# Patient Record
Sex: FEMALE | Race: WHITE | URBAN - METROPOLITAN AREA
[De-identification: names, ages, dates, MRNs, and addresses within clinical notes are randomized per-mention and may not be internally consistent; named-entity substitution may affect disease eponyms.]

---

## 2022-02-22 ENCOUNTER — HISTORICAL (OUTPATIENT)
Dept: ADMINISTRATIVE | Facility: HOSPITAL | Age: 87
End: 2022-02-22

## 2022-02-22 LAB
ABS NEUT (OLG): 4.68 (ref 2.1–9.2)
ALBUMIN SERPL-MCNC: 2.7 G/DL (ref 3.4–4.8)
ALBUMIN/GLOB SERPL: 1 {RATIO} (ref 1.1–2)
ALP SERPL-CCNC: 412 U/L (ref 40–150)
ALT SERPL-CCNC: 24 U/L (ref 0–55)
AST SERPL-CCNC: 25 U/L (ref 5–34)
BASOPHILS # BLD AUTO: 0 10*3/UL (ref 0–0.2)
BASOPHILS NFR BLD AUTO: 0 %
BILIRUB SERPL-MCNC: 0.6 MG/DL
BILIRUBIN DIRECT+TOT PNL SERPL-MCNC: 0.2 (ref 0–0.5)
BILIRUBIN DIRECT+TOT PNL SERPL-MCNC: 0.4 (ref 0–0.8)
BUN SERPL-MCNC: 18.4 MG/DL (ref 9.8–20.1)
CALCIUM SERPL-MCNC: 8.9 MG/DL (ref 8.7–10.5)
CHLORIDE SERPL-SCNC: 103 MMOL/L (ref 98–107)
CHOLEST SERPL-MCNC: 212 MG/DL
CHOLEST/HDLC SERPL: 11 {RATIO} (ref 0–5)
CO2 SERPL-SCNC: 28 MMOL/L (ref 23–31)
CREAT SERPL-MCNC: 1.1 MG/DL (ref 0.55–1.02)
DEPRECATED CALCIDIOL+CALCIFEROL SERPL-MC: 94.4 NG/ML (ref 30–80)
EOSINOPHIL # BLD AUTO: 0.3 10*3/UL (ref 0–0.9)
EOSINOPHIL NFR BLD AUTO: 4 %
ERYTHROCYTE [DISTWIDTH] IN BLOOD BY AUTOMATED COUNT: 14.3 % (ref 11.5–17)
GLOBULIN SER-MCNC: 2.6 G/DL (ref 2.4–3.5)
GLUCOSE SERPL-MCNC: 143 MG/DL (ref 82–115)
HCT VFR BLD AUTO: 33.2 % (ref 37–47)
HDLC SERPL-MCNC: 19 MG/DL (ref 35–60)
HEMOLYSIS INTERF INDEX SERPL-ACNC: 25
HEMOLYSIS INTERF INDEX SERPL-ACNC: 25
HGB BLD-MCNC: 10.2 G/DL (ref 12–16)
ICTERIC INTERF INDEX SERPL-ACNC: 0
LDLC SERPL CALC-MCNC: 146 MG/DL (ref 50–140)
LIPEMIC INTERF INDEX SERPL-ACNC: 9
LYMPHOCYTES # BLD AUTO: 1.8 10*3/UL (ref 0.6–4.6)
LYMPHOCYTES NFR BLD AUTO: 24 %
MAGNESIUM SERPL-MCNC: 2.1 MG/DL (ref 1.6–2.6)
MANUAL DIFF? (OHS): NO
MCH RBC QN AUTO: 29.9 PG (ref 27–31)
MCHC RBC AUTO-ENTMCNC: 30.7 G/DL (ref 33–36)
MCV RBC AUTO: 97.4 FL (ref 80–94)
MONOCYTES # BLD AUTO: 0.7 10*3/UL (ref 0.1–1.3)
MONOCYTES NFR BLD AUTO: 9 %
NEUTROPHILS # BLD AUTO: 4.68 10*3/UL (ref 2.1–9.2)
NEUTROPHILS NFR BLD AUTO: 62 %
PLATELET # BLD AUTO: 363 10*3/UL (ref 130–400)
PMV BLD AUTO: 10.6 FL (ref 9.4–12.4)
POTASSIUM SERPL-SCNC: 4.2 MMOL/L (ref 3.5–5.1)
PROT SERPL-MCNC: 5.3 G/DL (ref 5.8–7.6)
RBC # BLD AUTO: 3.41 10*6/UL (ref 4.2–5.4)
SODIUM SERPL-SCNC: 140 MMOL/L (ref 136–145)
T4 FREE SERPL-MCNC: 0.96 NG/DL (ref 0.7–1.48)
TRIGL SERPL-MCNC: 234 MG/DL (ref 37–140)
TSH SERPL-ACNC: 0.76 M[IU]/L (ref 0.35–4.94)
VLDLC SERPL CALC-MCNC: 47 MG/DL
WBC # SPEC AUTO: 7.5 10*3/UL (ref 4.5–11.5)

## 2022-02-23 ENCOUNTER — HISTORICAL (OUTPATIENT)
Dept: ADMINISTRATIVE | Facility: HOSPITAL | Age: 87
End: 2022-02-23

## 2022-02-23 LAB
ALBUMIN SERPL-MCNC: 2.8 G/DL (ref 3.4–4.8)
ALBUMIN/GLOB SERPL: 1.2 {RATIO} (ref 1.1–2)
ALP SERPL-CCNC: 395 U/L (ref 40–150)
ALT SERPL-CCNC: 24 U/L (ref 0–55)
AST SERPL-CCNC: 40 U/L (ref 5–34)
BILIRUB SERPL-MCNC: 0.7 MG/DL
BILIRUBIN DIRECT+TOT PNL SERPL-MCNC: 0.3 (ref 0–0.5)
BILIRUBIN DIRECT+TOT PNL SERPL-MCNC: 0.4 (ref 0–0.8)
BUN SERPL-MCNC: 18.2 MG/DL (ref 9.8–20.1)
CALCIUM SERPL-MCNC: 8.6 MG/DL (ref 8.7–10.5)
CHLORIDE SERPL-SCNC: 106 MMOL/L (ref 98–107)
CO2 SERPL-SCNC: 29 MMOL/L (ref 23–31)
CREAT SERPL-MCNC: 0.75 MG/DL (ref 0.55–1.02)
GLOBULIN SER-MCNC: 2.4 G/DL (ref 2.4–3.5)
GLUCOSE SERPL-MCNC: 98 MG/DL (ref 82–115)
HEMOLYSIS INTERF INDEX SERPL-ACNC: 1
ICTERIC INTERF INDEX SERPL-ACNC: 1
LIPEMIC INTERF INDEX SERPL-ACNC: 0
POTASSIUM SERPL-SCNC: 3.8 MMOL/L (ref 3.5–5.1)
PROT SERPL-MCNC: 5.2 G/DL (ref 5.8–7.6)
SODIUM SERPL-SCNC: 144 MMOL/L (ref 136–145)

## 2022-03-26 ENCOUNTER — HISTORICAL (OUTPATIENT)
Dept: ADMINISTRATIVE | Facility: HOSPITAL | Age: 87
End: 2022-03-26

## 2022-03-26 LAB
ABS NEUT (OLG): 11.36 (ref 2.1–9.2)
APPEARANCE, UA: CLEAR
BACTERIA SPEC CULT: NORMAL
BASOPHILS # BLD AUTO: 0 10*3/UL (ref 0–0.2)
BASOPHILS NFR BLD AUTO: 0 %
BILIRUB UR QL STRIP: NORMAL
BUDDING YEAST: NORMAL
BUN SERPL-MCNC: 26.6 MG/DL (ref 9.8–20.1)
CALCIUM SERPL-MCNC: 9.1 MG/DL (ref 8.7–10.5)
CASTS, UA: NORMAL
CHLORIDE SERPL-SCNC: 102 MMOL/L (ref 98–107)
CO2 SERPL-SCNC: 23 MMOL/L (ref 23–31)
COLOR UR: NORMAL
CREAT SERPL-MCNC: 1.04 MG/DL (ref 0.55–1.02)
CREAT/UREA NIT SERPL: 26
CRYSTALS: NORMAL
ERYTHROCYTE [DISTWIDTH] IN BLOOD BY AUTOMATED COUNT: 14.7 % (ref 11.5–17)
GLUCOSE (UA): NEGATIVE
GLUCOSE SERPL-MCNC: 181 MG/DL (ref 82–115)
HCT VFR BLD AUTO: 38 % (ref 37–47)
HEMOLYSIS INTERF INDEX SERPL-ACNC: 18
HGB BLD-MCNC: 11.7 G/DL (ref 12–16)
HGB UR QL STRIP: NEGATIVE
ICTERIC INTERF INDEX SERPL-ACNC: 3
KETONES UR QL STRIP: NEGATIVE
LEUKOCYTE ESTERASE UR QL STRIP: NORMAL
LIPEMIC INTERF INDEX SERPL-ACNC: 0
LYMPHOCYTES # BLD AUTO: 1.2 10*3/UL (ref 0.6–4.6)
LYMPHOCYTES NFR BLD AUTO: 9 %
MANUAL DIFF? (OHS): NO
MCH RBC QN AUTO: 29.7 PG (ref 27–31)
MCHC RBC AUTO-ENTMCNC: 30.8 G/DL (ref 33–36)
MCV RBC AUTO: 96.4 FL (ref 80–94)
MONOCYTES # BLD AUTO: 0.8 10*3/UL (ref 0.1–1.3)
MONOCYTES NFR BLD AUTO: 6 %
NEUTROPHILS # BLD AUTO: 11.36 10*3/UL (ref 2.1–9.2)
NEUTROPHILS NFR BLD AUTO: 83 %
NITRITE UR QL STRIP: NEGATIVE
PH UR STRIP: 6.5 [PH] (ref 5–9)
PLATELET # BLD AUTO: 268 10*3/UL (ref 130–400)
PMV BLD AUTO: 11.2 FL (ref 9.4–12.4)
POTASSIUM SERPL-SCNC: 4.1 MMOL/L (ref 3.5–5.1)
PROT UR QL STRIP: NORMAL
RBC # BLD AUTO: 3.94 10*6/UL (ref 4.2–5.4)
RBC #/AREA URNS HPF: NORMAL /[HPF] (ref 0–2)
SMALL ROUND CELLS, UA: NORMAL
SODIUM SERPL-SCNC: 138 MMOL/L (ref 136–145)
SP GR UR STRIP: 1.02 (ref 1–1.03)
SPERM URNS QL MICRO: NORMAL
SQUAMOUS EPITHELIAL, UA: NORMAL (ref 0–4)
UROBILINOGEN UR STRIP-ACNC: 2
WBC # SPEC AUTO: 13.6 10*3/UL (ref 4.5–11.5)
WBC #/AREA URNS HPF: 15 /[HPF] (ref 0–3)

## 2022-03-28 ENCOUNTER — HISTORICAL (OUTPATIENT)
Dept: ADMINISTRATIVE | Facility: HOSPITAL | Age: 87
End: 2022-03-28

## 2022-03-28 LAB
ABS NEUT (OLG): 9.05 (ref 2.1–9.2)
BASOPHILS # BLD AUTO: 0 10*3/UL (ref 0–0.2)
BASOPHILS NFR BLD AUTO: 0 %
BUN SERPL-MCNC: 18 MG/DL (ref 9.8–20.1)
CALCIUM SERPL-MCNC: 9.3 MG/DL (ref 8.7–10.5)
CHLORIDE SERPL-SCNC: 101 MMOL/L (ref 98–107)
CO2 SERPL-SCNC: 24 MMOL/L (ref 23–31)
CREAT SERPL-MCNC: 0.74 MG/DL (ref 0.55–1.02)
CREAT/UREA NIT SERPL: 24
EOSINOPHIL # BLD AUTO: 0.2 10*3/UL (ref 0–0.9)
EOSINOPHIL NFR BLD AUTO: 2 %
ERYTHROCYTE [DISTWIDTH] IN BLOOD BY AUTOMATED COUNT: 14.6 % (ref 11.5–17)
GLUCOSE SERPL-MCNC: 136 MG/DL (ref 82–115)
HCT VFR BLD AUTO: 38.7 % (ref 37–47)
HEMOLYSIS INTERF INDEX SERPL-ACNC: 10
HGB BLD-MCNC: 11.9 G/DL (ref 12–16)
ICTERIC INTERF INDEX SERPL-ACNC: 1
LIPEMIC INTERF INDEX SERPL-ACNC: 6
LYMPHOCYTES # BLD AUTO: 1.9 10*3/UL (ref 0.6–4.6)
LYMPHOCYTES NFR BLD AUTO: 16 %
MANUAL DIFF? (OHS): NO
MCH RBC QN AUTO: 30.1 PG (ref 27–31)
MCHC RBC AUTO-ENTMCNC: 30.7 G/DL (ref 33–36)
MCV RBC AUTO: 97.7 FL (ref 80–94)
MONOCYTES # BLD AUTO: 0.6 10*3/UL (ref 0.1–1.3)
MONOCYTES NFR BLD AUTO: 5 %
NEUTROPHILS # BLD AUTO: 9.05 10*3/UL (ref 2.1–9.2)
NEUTROPHILS NFR BLD AUTO: 76 %
PLATELET # BLD AUTO: 316 10*3/UL (ref 130–400)
PMV BLD AUTO: 11.3 FL (ref 9.4–12.4)
POTASSIUM SERPL-SCNC: 4.2 MMOL/L (ref 3.5–5.1)
RBC # BLD AUTO: 3.96 10*6/UL (ref 4.2–5.4)
SODIUM SERPL-SCNC: 138 MMOL/L (ref 136–145)
WBC # SPEC AUTO: 11.9 10*3/UL (ref 4.5–11.5)

## 2022-03-29 ENCOUNTER — HISTORICAL (OUTPATIENT)
Dept: ADMINISTRATIVE | Facility: HOSPITAL | Age: 87
End: 2022-03-29

## 2022-03-29 LAB
ABS NEUT (OLG): 7.21 (ref 2.1–9.2)
BASOPHILS # BLD AUTO: 0.1 10*3/UL (ref 0–0.2)
BASOPHILS NFR BLD AUTO: 1 %
BUN SERPL-MCNC: 19.4 MG/DL (ref 9.8–20.1)
CALCIUM SERPL-MCNC: 9.5 MG/DL (ref 8.7–10.5)
CHLORIDE SERPL-SCNC: 102 MMOL/L (ref 98–107)
CO2 SERPL-SCNC: 22 MMOL/L (ref 23–31)
CREAT SERPL-MCNC: 0.82 MG/DL (ref 0.55–1.02)
CREAT/UREA NIT SERPL: 24
EOSINOPHIL # BLD AUTO: 0.2 10*3/UL (ref 0–0.9)
EOSINOPHIL NFR BLD AUTO: 2 %
ERYTHROCYTE [DISTWIDTH] IN BLOOD BY AUTOMATED COUNT: 14.6 % (ref 11.5–17)
GLUCOSE SERPL-MCNC: 95 MG/DL (ref 82–115)
HCT VFR BLD AUTO: 44 % (ref 37–47)
HEMOLYSIS INTERF INDEX SERPL-ACNC: 39
HGB BLD-MCNC: 12.7 G/DL (ref 12–16)
ICTERIC INTERF INDEX SERPL-ACNC: 1
LIPEMIC INTERF INDEX SERPL-ACNC: 6
LYMPHOCYTES # BLD AUTO: 2.3 10*3/UL (ref 0.6–4.6)
LYMPHOCYTES NFR BLD AUTO: 22 %
MANUAL DIFF? (OHS): NO
MCH RBC QN AUTO: 29.7 PG (ref 27–31)
MCHC RBC AUTO-ENTMCNC: 28.9 G/DL (ref 33–36)
MCV RBC AUTO: 103 FL (ref 80–94)
MONOCYTES # BLD AUTO: 0.6 10*3/UL (ref 0.1–1.3)
MONOCYTES NFR BLD AUTO: 6 %
NEUTROPHILS # BLD AUTO: 7.21 10*3/UL (ref 2.1–9.2)
NEUTROPHILS NFR BLD AUTO: 69 %
PLATELET # BLD AUTO: 276 10*3/UL (ref 130–400)
PMV BLD AUTO: 11.2 FL (ref 9.4–12.4)
POS ERR1 (OHS): NORMAL
POTASSIUM SERPL-SCNC: 5 MMOL/L (ref 3.5–5.1)
RBC # BLD AUTO: 4.27 10*6/UL (ref 4.2–5.4)
SODIUM SERPL-SCNC: 138 MMOL/L (ref 136–145)
WBC # SPEC AUTO: 10.4 10*3/UL (ref 4.5–11.5)

## 2022-04-01 ENCOUNTER — HISTORICAL (OUTPATIENT)
Dept: ADMINISTRATIVE | Facility: HOSPITAL | Age: 87
End: 2022-04-01

## 2022-04-01 LAB
ABS NEUT (OLG): 6.42 (ref 2.1–9.2)
BASOPHILS # BLD AUTO: 0.1 10*3/UL (ref 0–0.2)
BASOPHILS NFR BLD AUTO: 1 %
BUN SERPL-MCNC: 18.7 MG/DL (ref 9.8–20.1)
CALCIUM SERPL-MCNC: 9.8 MG/DL (ref 8.7–10.5)
CHLORIDE SERPL-SCNC: 102 MMOL/L (ref 98–107)
CO2 SERPL-SCNC: 26 MMOL/L (ref 23–31)
CREAT SERPL-MCNC: 0.81 MG/DL (ref 0.55–1.02)
CREAT/UREA NIT SERPL: 23
EOSINOPHIL # BLD AUTO: 0.2 10*3/UL (ref 0–0.9)
EOSINOPHIL NFR BLD AUTO: 2 %
ERYTHROCYTE [DISTWIDTH] IN BLOOD BY AUTOMATED COUNT: 14.1 % (ref 11.5–17)
GLUCOSE SERPL-MCNC: 131 MG/DL (ref 82–115)
HCT VFR BLD AUTO: 41.4 % (ref 37–47)
HEMOLYSIS INTERF INDEX SERPL-ACNC: 22
HGB BLD-MCNC: 12.7 G/DL (ref 12–16)
ICTERIC INTERF INDEX SERPL-ACNC: 0
LIPEMIC INTERF INDEX SERPL-ACNC: 16
LYMPHOCYTES # BLD AUTO: 2.2 10*3/UL (ref 0.6–4.6)
LYMPHOCYTES NFR BLD AUTO: 24 %
MANUAL DIFF? (OHS): NO
MCH RBC QN AUTO: 30.3 PG (ref 27–31)
MCHC RBC AUTO-ENTMCNC: 30.7 G/DL (ref 33–36)
MCV RBC AUTO: 98.8 FL (ref 80–94)
MONOCYTES # BLD AUTO: 0.4 10*3/UL (ref 0.1–1.3)
MONOCYTES NFR BLD AUTO: 5 %
NEUTROPHILS # BLD AUTO: 6.42 10*3/UL (ref 2.1–9.2)
NEUTROPHILS NFR BLD AUTO: 68 %
PLATELET # BLD AUTO: 401 10*3/UL (ref 130–400)
PMV BLD AUTO: 11 FL (ref 9.4–12.4)
POTASSIUM SERPL-SCNC: 4.3 MMOL/L (ref 3.5–5.1)
RBC # BLD AUTO: 4.19 10*6/UL (ref 4.2–5.4)
SODIUM SERPL-SCNC: 140 MMOL/L (ref 136–145)
WBC # SPEC AUTO: 9.4 10*3/UL (ref 4.5–11.5)

## 2022-04-18 ENCOUNTER — HISTORICAL (OUTPATIENT)
Dept: ADMINISTRATIVE | Facility: HOSPITAL | Age: 87
End: 2022-04-18
Payer: MEDICARE

## 2022-04-18 LAB
ABS NEUT (OLG): 3.35 (ref 2.1–9.2)
ALBUMIN SERPL-MCNC: 3.9 G/DL (ref 3.4–4.8)
ALBUMIN/GLOB SERPL: 1.3 {RATIO} (ref 1.1–2)
ALP SERPL-CCNC: 102 U/L (ref 40–150)
ALT SERPL-CCNC: 10 U/L (ref 0–55)
AST SERPL-CCNC: 23 U/L (ref 5–34)
BASOPHILS # BLD AUTO: 0.1 10*3/UL (ref 0–0.2)
BASOPHILS NFR BLD AUTO: 1 %
BILIRUB SERPL-MCNC: 0.5 MG/DL
BILIRUBIN DIRECT+TOT PNL SERPL-MCNC: 0.2 (ref 0–0.5)
BILIRUBIN DIRECT+TOT PNL SERPL-MCNC: 0.3 (ref 0–0.8)
BUN SERPL-MCNC: 16.9 MG/DL (ref 9.8–20.1)
CALCIUM SERPL-MCNC: 9.5 MG/DL (ref 8.7–10.5)
CHLORIDE SERPL-SCNC: 104 MMOL/L (ref 98–107)
CHOLEST SERPL-MCNC: 280 MG/DL
CHOLEST/HDLC SERPL: 9 {RATIO} (ref 0–5)
CO2 SERPL-SCNC: 25 MMOL/L (ref 23–31)
CREAT SERPL-MCNC: 0.81 MG/DL (ref 0.55–1.02)
DEPRECATED CALCIDIOL+CALCIFEROL SERPL-MC: 66.7 NG/ML (ref 30–80)
EOSINOPHIL # BLD AUTO: 0.2 10*3/UL (ref 0–0.9)
EOSINOPHIL NFR BLD AUTO: 4 %
ERYTHROCYTE [DISTWIDTH] IN BLOOD BY AUTOMATED COUNT: 14.3 % (ref 11.5–17)
GLOBULIN SER-MCNC: 3.1 G/DL (ref 2.4–3.5)
GLUCOSE SERPL-MCNC: 102 MG/DL (ref 82–115)
HCT VFR BLD AUTO: 44.3 % (ref 37–47)
HDLC SERPL-MCNC: 30 MG/DL (ref 35–60)
HEMOLYSIS INTERF INDEX SERPL-ACNC: 150
HGB BLD-MCNC: 13.6 G/DL (ref 12–16)
ICTERIC INTERF INDEX SERPL-ACNC: 0
LDLC SERPL CALC-MCNC: NORMAL MG/DL (ref 50–140)
LIPEMIC INTERF INDEX SERPL-ACNC: 25
LYMPHOCYTES # BLD AUTO: 2.4 10*3/UL (ref 0.6–4.6)
LYMPHOCYTES NFR BLD AUTO: 36 %
MANUAL DIFF? (OHS): NO
MCH RBC QN AUTO: 29.3 PG (ref 27–31)
MCHC RBC AUTO-ENTMCNC: 30.7 G/DL (ref 33–36)
MCV RBC AUTO: 95.5 FL (ref 80–94)
MONOCYTES # BLD AUTO: 0.5 10*3/UL (ref 0.1–1.3)
MONOCYTES NFR BLD AUTO: 8 %
NEUTROPHILS # BLD AUTO: 3.35 10*3/UL (ref 2.1–9.2)
NEUTROPHILS NFR BLD AUTO: 51 %
PLATELET # BLD AUTO: 233 10*3/UL (ref 130–400)
PMV BLD AUTO: 11.5 FL (ref 9.4–12.4)
POTASSIUM SERPL-SCNC: 4.7 MMOL/L (ref 3.5–5.1)
PREALB SERPL-MCNC: 22.4 (ref 14–37)
PROT SERPL-MCNC: 7 G/DL (ref 5.8–7.6)
RBC # BLD AUTO: 4.64 10*6/UL (ref 4.2–5.4)
SODIUM SERPL-SCNC: 140 MMOL/L (ref 136–145)
T4 SERPL-MCNC: 6.8 UG/DL (ref 4.87–11.72)
TRIGL SERPL-MCNC: 446 MG/DL (ref 37–140)
VLDLC SERPL CALC-MCNC: NORMAL MG/DL
WBC # SPEC AUTO: 6.6 10*3/UL (ref 4.5–11.5)

## 2022-04-22 ENCOUNTER — HISTORICAL (OUTPATIENT)
Dept: ADMINISTRATIVE | Facility: HOSPITAL | Age: 87
End: 2022-04-22
Payer: MEDICARE

## 2022-04-22 LAB
CHOLEST SERPL-MCNC: 278 MG/DL
CHOLEST/HDLC SERPL: 10 {RATIO} (ref 0–5)
HDLC SERPL-MCNC: 28 MG/DL (ref 35–60)
LDLC SERPL CALC-MCNC: NORMAL MG/DL (ref 50–140)
TRIGL SERPL-MCNC: 527 MG/DL (ref 37–140)
VLDLC SERPL CALC-MCNC: NORMAL MG/DL

## 2022-05-20 ENCOUNTER — LAB REQUISITION (OUTPATIENT)
Dept: LAB | Facility: HOSPITAL | Age: 87
End: 2022-05-20
Payer: MEDICARE

## 2022-05-20 DIAGNOSIS — R79.9 ABNORMAL FINDING OF BLOOD CHEMISTRY, UNSPECIFIED: ICD-10-CM

## 2022-05-20 LAB
ANION GAP SERPL CALC-SCNC: 12 MEQ/L
BASOPHILS # BLD AUTO: 0.06 X10(3)/MCL (ref 0–0.2)
BASOPHILS NFR BLD AUTO: 0.6 %
BUN SERPL-MCNC: 20.6 MG/DL (ref 9.8–20.1)
CALCIUM SERPL-MCNC: 10.1 MG/DL (ref 8.4–10.2)
CHLORIDE SERPL-SCNC: 103 MMOL/L (ref 98–107)
CO2 SERPL-SCNC: 24 MMOL/L (ref 23–31)
CREAT SERPL-MCNC: 0.76 MG/DL (ref 0.55–1.02)
CREAT/UREA NIT SERPL: 27
EOSINOPHIL # BLD AUTO: 0.19 X10(3)/MCL (ref 0–0.9)
EOSINOPHIL NFR BLD AUTO: 2 %
ERYTHROCYTE [DISTWIDTH] IN BLOOD BY AUTOMATED COUNT: 13.6 % (ref 11.5–17)
GLUCOSE SERPL-MCNC: 105 MG/DL (ref 82–115)
HCT VFR BLD AUTO: 45.3 % (ref 37–47)
HGB BLD-MCNC: 14.1 GM/DL (ref 12–16)
IMM GRANULOCYTES # BLD AUTO: 0.04 X10(3)/MCL (ref 0–0.02)
IMM GRANULOCYTES NFR BLD AUTO: 0.4 % (ref 0–0.43)
LYMPHOCYTES # BLD AUTO: 2.99 X10(3)/MCL (ref 0.6–4.6)
LYMPHOCYTES NFR BLD AUTO: 31.4 %
MCH RBC QN AUTO: 29.4 PG (ref 27–31)
MCHC RBC AUTO-ENTMCNC: 31.1 MG/DL (ref 33–36)
MCV RBC AUTO: 94.6 FL (ref 80–94)
MONOCYTES # BLD AUTO: 0.74 X10(3)/MCL (ref 0.1–1.3)
MONOCYTES NFR BLD AUTO: 7.8 %
NEUTROPHILS # BLD AUTO: 5.5 X10(3)/MCL (ref 2.1–9.2)
NEUTROPHILS NFR BLD AUTO: 57.8 %
NRBC BLD AUTO-RTO: 0 %
PLATELET # BLD AUTO: 217 X10(3)/MCL (ref 130–400)
PMV BLD AUTO: 11.3 FL (ref 9.4–12.4)
POTASSIUM SERPL-SCNC: 4.9 MMOL/L (ref 3.5–5.1)
RBC # BLD AUTO: 4.79 X10(6)/MCL (ref 4.2–5.4)
SODIUM SERPL-SCNC: 139 MMOL/L (ref 136–145)
WBC # SPEC AUTO: 9.5 X10(3)/MCL (ref 4.5–11.5)

## 2022-05-20 PROCEDURE — 80048 BASIC METABOLIC PNL TOTAL CA: CPT | Performed by: NURSE PRACTITIONER

## 2022-05-20 PROCEDURE — 85025 COMPLETE CBC W/AUTO DIFF WBC: CPT | Performed by: NURSE PRACTITIONER

## 2022-08-10 ENCOUNTER — LAB REQUISITION (OUTPATIENT)
Dept: LAB | Facility: HOSPITAL | Age: 87
End: 2022-08-10
Payer: MEDICARE

## 2022-08-10 DIAGNOSIS — R79.9 ABNORMAL FINDING OF BLOOD CHEMISTRY, UNSPECIFIED: ICD-10-CM

## 2022-08-10 LAB
ANION GAP SERPL CALC-SCNC: 9 MEQ/L
BASOPHILS # BLD AUTO: 0.05 X10(3)/MCL (ref 0–0.2)
BASOPHILS NFR BLD AUTO: 0.6 %
BUN SERPL-MCNC: 18.8 MG/DL (ref 9.8–20.1)
CALCIUM SERPL-MCNC: 9.7 MG/DL (ref 8.4–10.2)
CHLORIDE SERPL-SCNC: 103 MMOL/L (ref 98–107)
CO2 SERPL-SCNC: 27 MMOL/L (ref 23–31)
CREAT SERPL-MCNC: 0.83 MG/DL (ref 0.55–1.02)
CREAT/UREA NIT SERPL: 23
EOSINOPHIL # BLD AUTO: 0.16 X10(3)/MCL (ref 0–0.9)
EOSINOPHIL NFR BLD AUTO: 1.8 %
ERYTHROCYTE [DISTWIDTH] IN BLOOD BY AUTOMATED COUNT: 14.4 % (ref 11.5–17)
GFR SERPLBLD CREATININE-BSD FMLA CKD-EPI: >60 MLS/MIN/1.73/M2
GLUCOSE SERPL-MCNC: 129 MG/DL (ref 82–115)
HCT VFR BLD AUTO: 44.5 % (ref 37–47)
HGB BLD-MCNC: 14 GM/DL (ref 12–16)
IMM GRANULOCYTES # BLD AUTO: 0.03 X10(3)/MCL (ref 0–0.04)
IMM GRANULOCYTES NFR BLD AUTO: 0.3 %
LYMPHOCYTES # BLD AUTO: 2.59 X10(3)/MCL (ref 0.6–4.6)
LYMPHOCYTES NFR BLD AUTO: 29.7 %
MCH RBC QN AUTO: 29.6 PG (ref 27–31)
MCHC RBC AUTO-ENTMCNC: 31.5 MG/DL (ref 33–36)
MCV RBC AUTO: 94.1 FL (ref 80–94)
MONOCYTES # BLD AUTO: 0.51 X10(3)/MCL (ref 0.1–1.3)
MONOCYTES NFR BLD AUTO: 5.8 %
NEUTROPHILS # BLD AUTO: 5.4 X10(3)/MCL (ref 2.1–9.2)
NEUTROPHILS NFR BLD AUTO: 61.8 %
NRBC BLD AUTO-RTO: 0 %
PLATELET # BLD AUTO: 214 X10(3)/MCL (ref 130–400)
PMV BLD AUTO: 11.4 FL (ref 7.4–10.4)
POTASSIUM SERPL-SCNC: 4.4 MMOL/L (ref 3.5–5.1)
RBC # BLD AUTO: 4.73 X10(6)/MCL (ref 4.2–5.4)
SODIUM SERPL-SCNC: 139 MMOL/L (ref 136–145)
WBC # SPEC AUTO: 8.7 X10(3)/MCL (ref 4.5–11.5)

## 2022-08-10 PROCEDURE — 85025 COMPLETE CBC W/AUTO DIFF WBC: CPT | Performed by: NURSE PRACTITIONER

## 2022-08-10 PROCEDURE — 80048 BASIC METABOLIC PNL TOTAL CA: CPT | Performed by: NURSE PRACTITIONER

## 2022-08-12 ENCOUNTER — LAB REQUISITION (OUTPATIENT)
Dept: LAB | Facility: HOSPITAL | Age: 87
End: 2022-08-12
Payer: MEDICARE

## 2022-08-12 DIAGNOSIS — R41.82 ALTERED MENTAL STATUS, UNSPECIFIED: ICD-10-CM

## 2022-08-12 LAB
APPEARANCE UR: CLEAR
BACTERIA #/AREA URNS AUTO: ABNORMAL /HPF
BILIRUB UR QL STRIP.AUTO: NEGATIVE MG/DL
COLOR UR AUTO: YELLOW
GLUCOSE UR QL STRIP.AUTO: NEGATIVE MG/DL
KETONES UR QL STRIP.AUTO: NEGATIVE MG/DL
LEUKOCYTE ESTERASE UR QL STRIP.AUTO: ABNORMAL UNIT/L
NITRITE UR QL STRIP.AUTO: NEGATIVE
PH UR STRIP.AUTO: 6.5 [PH]
PROT UR QL STRIP.AUTO: NEGATIVE MG/DL
RBC #/AREA URNS AUTO: <5 /HPF
RBC UR QL AUTO: NEGATIVE UNIT/L
SP GR UR STRIP.AUTO: 1.02 (ref 1–1.03)
SQUAMOUS #/AREA URNS AUTO: <5 /HPF
UROBILINOGEN UR STRIP-ACNC: 0.2 MG/DL
WBC #/AREA URNS AUTO: 65 /HPF

## 2022-08-12 PROCEDURE — 87077 CULTURE AEROBIC IDENTIFY: CPT | Performed by: NURSE PRACTITIONER

## 2022-08-12 PROCEDURE — 81001 URINALYSIS AUTO W/SCOPE: CPT | Performed by: NURSE PRACTITIONER

## 2022-08-14 LAB — BACTERIA UR CULT: ABNORMAL

## 2022-10-16 ENCOUNTER — LAB REQUISITION (OUTPATIENT)
Dept: LAB | Facility: HOSPITAL | Age: 87
End: 2022-10-16
Payer: MEDICARE

## 2022-10-16 DIAGNOSIS — E03.9 HYPOTHYROIDISM, UNSPECIFIED: ICD-10-CM

## 2022-10-16 DIAGNOSIS — R79.9 ABNORMAL FINDING OF BLOOD CHEMISTRY, UNSPECIFIED: ICD-10-CM

## 2022-10-16 LAB
ALBUMIN SERPL-MCNC: 4.5 GM/DL (ref 3.4–4.8)
ALBUMIN/GLOB SERPL: 1.4 RATIO (ref 1.1–2)
ALP SERPL-CCNC: 73 UNIT/L (ref 40–150)
ALT SERPL-CCNC: 15 UNIT/L (ref 0–55)
AST SERPL-CCNC: 20 UNIT/L (ref 5–34)
BASOPHILS # BLD AUTO: 0.06 X10(3)/MCL (ref 0–0.2)
BASOPHILS NFR BLD AUTO: 0.6 %
BILIRUBIN DIRECT+TOT PNL SERPL-MCNC: 0.6 MG/DL
BUN SERPL-MCNC: 19.2 MG/DL (ref 9.8–20.1)
CALCIUM SERPL-MCNC: 10.1 MG/DL (ref 8.4–10.2)
CHLORIDE SERPL-SCNC: 100 MMOL/L (ref 98–107)
CHOLEST SERPL-MCNC: 296 MG/DL
CHOLEST/HDLC SERPL: 8 {RATIO} (ref 0–5)
CO2 SERPL-SCNC: 24 MMOL/L (ref 23–31)
CREAT SERPL-MCNC: 0.77 MG/DL (ref 0.55–1.02)
DEPRECATED CALCIDIOL+CALCIFEROL SERPL-MC: 45.8 NG/ML (ref 30–80)
EOSINOPHIL # BLD AUTO: 0.22 X10(3)/MCL (ref 0–0.9)
EOSINOPHIL NFR BLD AUTO: 2.1 %
ERYTHROCYTE [DISTWIDTH] IN BLOOD BY AUTOMATED COUNT: 13.2 % (ref 11.5–17)
GFR SERPLBLD CREATININE-BSD FMLA CKD-EPI: >60 MLS/MIN/1.73/M2
GLOBULIN SER-MCNC: 3.2 GM/DL (ref 2.4–3.5)
GLUCOSE SERPL-MCNC: 96 MG/DL (ref 82–115)
HCT VFR BLD AUTO: 47.1 % (ref 37–47)
HDLC SERPL-MCNC: 37 MG/DL (ref 35–60)
HGB BLD-MCNC: 15.2 GM/DL (ref 12–16)
IMM GRANULOCYTES # BLD AUTO: 0.05 X10(3)/MCL (ref 0–0.04)
IMM GRANULOCYTES NFR BLD AUTO: 0.5 %
LDLC SERPL CALC-MCNC: 135 MG/DL (ref 50–140)
LYMPHOCYTES # BLD AUTO: 3.79 X10(3)/MCL (ref 0.6–4.6)
LYMPHOCYTES NFR BLD AUTO: 36.6 %
MCH RBC QN AUTO: 30.3 PG (ref 27–31)
MCHC RBC AUTO-ENTMCNC: 32.3 MG/DL (ref 33–36)
MCV RBC AUTO: 93.8 FL (ref 80–94)
MONOCYTES # BLD AUTO: 0.74 X10(3)/MCL (ref 0.1–1.3)
MONOCYTES NFR BLD AUTO: 7.1 %
NEUTROPHILS # BLD AUTO: 5.5 X10(3)/MCL (ref 2.1–9.2)
NEUTROPHILS NFR BLD AUTO: 53.1 %
NRBC BLD AUTO-RTO: 0 %
PLATELET # BLD AUTO: 254 X10(3)/MCL (ref 130–400)
PMV BLD AUTO: 11.1 FL (ref 7.4–10.4)
POTASSIUM SERPL-SCNC: 4.8 MMOL/L (ref 3.5–5.1)
PREALB SERPL-MCNC: 27.3 MG/DL (ref 14–37)
PROT SERPL-MCNC: 7.7 GM/DL (ref 5.8–7.6)
RBC # BLD AUTO: 5.02 X10(6)/MCL (ref 4.2–5.4)
SODIUM SERPL-SCNC: 138 MMOL/L (ref 136–145)
T4 FREE SERPL-MCNC: 0.84 NG/DL (ref 0.7–1.48)
TRIGL SERPL-MCNC: 619 MG/DL (ref 37–140)
TSH SERPL-ACNC: 2.04 UIU/ML (ref 0.35–4.94)
VLDLC SERPL CALC-MCNC: 124 MG/DL
WBC # SPEC AUTO: 10.4 X10(3)/MCL (ref 4.5–11.5)

## 2022-10-16 PROCEDURE — 80053 COMPREHEN METABOLIC PANEL: CPT | Performed by: NURSE PRACTITIONER

## 2022-10-16 PROCEDURE — 80061 LIPID PANEL: CPT | Performed by: NURSE PRACTITIONER

## 2022-10-16 PROCEDURE — 85025 COMPLETE CBC W/AUTO DIFF WBC: CPT | Performed by: NURSE PRACTITIONER

## 2022-10-16 PROCEDURE — 82306 VITAMIN D 25 HYDROXY: CPT | Performed by: NURSE PRACTITIONER

## 2022-10-16 PROCEDURE — 84134 ASSAY OF PREALBUMIN: CPT | Performed by: NURSE PRACTITIONER

## 2022-10-16 PROCEDURE — 84443 ASSAY THYROID STIM HORMONE: CPT | Performed by: NURSE PRACTITIONER

## 2022-10-16 PROCEDURE — 84439 ASSAY OF FREE THYROXINE: CPT | Performed by: NURSE PRACTITIONER

## 2022-11-01 ENCOUNTER — LAB REQUISITION (OUTPATIENT)
Dept: LAB | Facility: HOSPITAL | Age: 87
End: 2022-11-01
Payer: MEDICARE

## 2022-11-01 DIAGNOSIS — R41.82 ALTERED MENTAL STATUS, UNSPECIFIED: ICD-10-CM

## 2022-11-01 DIAGNOSIS — R79.9 ABNORMAL FINDING OF BLOOD CHEMISTRY, UNSPECIFIED: ICD-10-CM

## 2022-11-01 LAB
ANION GAP SERPL CALC-SCNC: 7 MEQ/L
APPEARANCE UR: ABNORMAL
BACTERIA #/AREA URNS AUTO: ABNORMAL /HPF
BASOPHILS # BLD AUTO: 0.06 X10(3)/MCL (ref 0–0.2)
BASOPHILS NFR BLD AUTO: 0.7 %
BILIRUB UR QL STRIP.AUTO: NEGATIVE MG/DL
BUN SERPL-MCNC: 20.4 MG/DL (ref 9.8–20.1)
CALCIUM SERPL-MCNC: 9.7 MG/DL (ref 8.4–10.2)
CAOX CRY URNS QL MICRO: ABNORMAL /HPF
CHLORIDE SERPL-SCNC: 103 MMOL/L (ref 98–107)
CO2 SERPL-SCNC: 28 MMOL/L (ref 23–31)
COLOR UR AUTO: ABNORMAL
CREAT SERPL-MCNC: 0.89 MG/DL (ref 0.55–1.02)
CREAT/UREA NIT SERPL: 23
EOSINOPHIL # BLD AUTO: 0.18 X10(3)/MCL (ref 0–0.9)
EOSINOPHIL NFR BLD AUTO: 2.1 %
ERYTHROCYTE [DISTWIDTH] IN BLOOD BY AUTOMATED COUNT: 12.9 % (ref 11.5–17)
GFR SERPLBLD CREATININE-BSD FMLA CKD-EPI: >60 MLS/MIN/1.73/M2
GLUCOSE SERPL-MCNC: 106 MG/DL (ref 82–115)
GLUCOSE UR QL STRIP.AUTO: NEGATIVE MG/DL
HCT VFR BLD AUTO: 46.4 % (ref 37–47)
HGB BLD-MCNC: 13.8 GM/DL (ref 12–16)
HYALINE CASTS URNS QL MICRO: ABNORMAL /LPF
IMM GRANULOCYTES # BLD AUTO: 0.03 X10(3)/MCL (ref 0–0.04)
IMM GRANULOCYTES NFR BLD AUTO: 0.4 %
KETONES UR QL STRIP.AUTO: NEGATIVE MG/DL
LEUKOCYTE ESTERASE UR QL STRIP.AUTO: NEGATIVE UNIT/L
LYMPHOCYTES # BLD AUTO: 2.9 X10(3)/MCL (ref 0.6–4.6)
LYMPHOCYTES NFR BLD AUTO: 33.8 %
MCH RBC QN AUTO: 29.9 PG (ref 27–31)
MCHC RBC AUTO-ENTMCNC: 29.7 MG/DL (ref 33–36)
MCV RBC AUTO: 100.7 FL (ref 80–94)
MONOCYTES # BLD AUTO: 0.64 X10(3)/MCL (ref 0.1–1.3)
MONOCYTES NFR BLD AUTO: 7.5 %
MUCOUS THREADS URNS QL MICRO: ABNORMAL /LPF
NEUTROPHILS # BLD AUTO: 4.8 X10(3)/MCL (ref 2.1–9.2)
NEUTROPHILS NFR BLD AUTO: 55.5 %
NITRITE UR QL STRIP.AUTO: NEGATIVE
NRBC BLD AUTO-RTO: 0 %
PH UR STRIP.AUTO: 5.5 [PH]
PLATELET # BLD AUTO: 241 X10(3)/MCL (ref 130–400)
PMV BLD AUTO: 10 FL (ref 7.4–10.4)
POTASSIUM SERPL-SCNC: 4.6 MMOL/L (ref 3.5–5.1)
PROT UR QL STRIP.AUTO: ABNORMAL MG/DL
RBC # BLD AUTO: 4.61 X10(6)/MCL (ref 4.2–5.4)
RBC #/AREA URNS AUTO: <5 /HPF
RBC UR QL AUTO: NEGATIVE UNIT/L
SODIUM SERPL-SCNC: 138 MMOL/L (ref 136–145)
SP GR UR STRIP.AUTO: 1.03 (ref 1–1.03)
SQUAMOUS #/AREA URNS AUTO: 6 /HPF
UROBILINOGEN UR STRIP-ACNC: 0.2 MG/DL
WBC # SPEC AUTO: 8.6 X10(3)/MCL (ref 4.5–11.5)
WBC #/AREA URNS AUTO: <5 /HPF

## 2022-11-01 PROCEDURE — 80048 BASIC METABOLIC PNL TOTAL CA: CPT | Performed by: NURSE PRACTITIONER

## 2022-11-01 PROCEDURE — 81001 URINALYSIS AUTO W/SCOPE: CPT | Performed by: NURSE PRACTITIONER

## 2022-11-01 PROCEDURE — 85025 COMPLETE CBC W/AUTO DIFF WBC: CPT | Performed by: NURSE PRACTITIONER

## 2022-12-28 ENCOUNTER — LAB REQUISITION (OUTPATIENT)
Dept: LAB | Facility: HOSPITAL | Age: 87
End: 2022-12-28
Payer: MEDICARE

## 2022-12-28 DIAGNOSIS — R79.9 ABNORMAL FINDING OF BLOOD CHEMISTRY, UNSPECIFIED: ICD-10-CM

## 2022-12-28 LAB
ALBUMIN SERPL-MCNC: 3.9 G/DL (ref 3.4–4.8)
ALBUMIN/GLOB SERPL: 1.4 RATIO (ref 1.1–2)
ALP SERPL-CCNC: 72 UNIT/L (ref 40–150)
ALT SERPL-CCNC: 9 UNIT/L (ref 0–55)
AST SERPL-CCNC: 23 UNIT/L (ref 5–34)
BASOPHILS # BLD AUTO: 0.03 X10(3)/MCL (ref 0–0.2)
BASOPHILS NFR BLD AUTO: 0.6 %
BILIRUBIN DIRECT+TOT PNL SERPL-MCNC: 0.5 MG/DL
BUN SERPL-MCNC: 15.3 MG/DL (ref 9.8–20.1)
CALCIUM SERPL-MCNC: 9.2 MG/DL (ref 8.4–10.2)
CHLORIDE SERPL-SCNC: 103 MMOL/L (ref 98–107)
CO2 SERPL-SCNC: 23 MMOL/L (ref 23–31)
CREAT SERPL-MCNC: 0.86 MG/DL (ref 0.55–1.02)
CRP SERPL HS-MCNC: 6 MG/L
EOSINOPHIL # BLD AUTO: 0.11 X10(3)/MCL (ref 0–0.9)
EOSINOPHIL NFR BLD AUTO: 2.1 %
ERYTHROCYTE [DISTWIDTH] IN BLOOD BY AUTOMATED COUNT: 13.8 % (ref 11–14.5)
ERYTHROCYTE [SEDIMENTATION RATE] IN BLOOD: 20 MM/HR (ref 0–20)
GFR SERPLBLD CREATININE-BSD FMLA CKD-EPI: >60 MLS/MIN/1.73/M2
GLOBULIN SER-MCNC: 2.7 GM/DL (ref 2.4–3.5)
GLUCOSE SERPL-MCNC: 91 MG/DL (ref 82–115)
HCT VFR BLD AUTO: 42.7 % (ref 37–47)
HGB BLD-MCNC: 13.9 GM/DL (ref 12–16)
IMM GRANULOCYTES # BLD AUTO: 0.03 X10(3)/MCL (ref 0–0.04)
IMM GRANULOCYTES NFR BLD AUTO: 0.6 %
LYMPHOCYTES # BLD AUTO: 1.63 X10(3)/MCL (ref 0.6–4.6)
LYMPHOCYTES NFR BLD AUTO: 31.2 %
MAGNESIUM SERPL-MCNC: 2.2 MG/DL (ref 1.6–2.6)
MCH RBC QN AUTO: 30.6 PG
MCHC RBC AUTO-ENTMCNC: 32.6 MG/DL (ref 33–36)
MCV RBC AUTO: 94.1 FL (ref 80–94)
MONOCYTES # BLD AUTO: 0.62 X10(3)/MCL (ref 0.1–1.3)
MONOCYTES NFR BLD AUTO: 11.9 %
NEUTROPHILS # BLD AUTO: 2.8 X10(3)/MCL (ref 2.1–9.2)
NEUTROPHILS NFR BLD AUTO: 53.6 %
NRBC BLD AUTO-RTO: 0 % (ref 0–1)
PLATELET # BLD AUTO: 186 X10(3)/MCL (ref 140–371)
PLATELETS.RETICULATED NFR BLD AUTO: 3.1 % (ref 0.9–11.2)
PMV BLD AUTO: 10.7 FL (ref 9.4–12.4)
POTASSIUM SERPL-SCNC: 4.8 MMOL/L (ref 3.5–5.1)
PROT SERPL-MCNC: 6.6 GM/DL (ref 5.8–7.6)
RBC # BLD AUTO: 4.54 X10(6)/MCL (ref 4.2–5.4)
SODIUM SERPL-SCNC: 137 MMOL/L (ref 136–145)
WBC # SPEC AUTO: 5.2 X10(3)/MCL (ref 4.5–11.5)

## 2022-12-28 PROCEDURE — 83735 ASSAY OF MAGNESIUM: CPT | Performed by: NURSE PRACTITIONER

## 2022-12-28 PROCEDURE — 85651 RBC SED RATE NONAUTOMATED: CPT | Performed by: NURSE PRACTITIONER

## 2022-12-28 PROCEDURE — 85025 COMPLETE CBC W/AUTO DIFF WBC: CPT | Performed by: NURSE PRACTITIONER

## 2022-12-28 PROCEDURE — 86141 C-REACTIVE PROTEIN HS: CPT | Performed by: NURSE PRACTITIONER

## 2022-12-28 PROCEDURE — 80053 COMPREHEN METABOLIC PANEL: CPT | Performed by: NURSE PRACTITIONER

## 2023-04-17 ENCOUNTER — LAB REQUISITION (OUTPATIENT)
Dept: LAB | Facility: HOSPITAL | Age: 88
End: 2023-04-17
Payer: MEDICARE

## 2023-04-17 DIAGNOSIS — R79.9 ABNORMAL FINDING OF BLOOD CHEMISTRY, UNSPECIFIED: ICD-10-CM

## 2023-04-17 LAB
CHOLEST SERPL-MCNC: 245 MG/DL
CHOLEST/HDLC SERPL: 9 {RATIO} (ref 0–5)
HDLC SERPL-MCNC: 28 MG/DL (ref 35–60)
LDLC SERPL CALC-MCNC: 141 MG/DL (ref 50–140)
TRIGL SERPL-MCNC: 382 MG/DL (ref 37–140)
VLDLC SERPL CALC-MCNC: 76 MG/DL

## 2023-04-17 PROCEDURE — 80061 LIPID PANEL: CPT | Performed by: NURSE PRACTITIONER

## 2023-07-24 ENCOUNTER — LAB REQUISITION (OUTPATIENT)
Dept: LAB | Facility: HOSPITAL | Age: 88
End: 2023-07-24
Payer: MEDICARE

## 2023-07-24 DIAGNOSIS — R41.82 ALTERED MENTAL STATUS, UNSPECIFIED: ICD-10-CM

## 2023-07-24 LAB
APPEARANCE UR: ABNORMAL
BACTERIA #/AREA URNS AUTO: ABNORMAL /HPF
BILIRUB UR QL STRIP.AUTO: NEGATIVE
COLOR UR: ABNORMAL
GLUCOSE UR QL STRIP.AUTO: NEGATIVE
KETONES UR QL STRIP.AUTO: NEGATIVE
LEUKOCYTE ESTERASE UR QL STRIP.AUTO: ABNORMAL
NITRITE UR QL STRIP.AUTO: NEGATIVE
PH UR STRIP.AUTO: 5.5 [PH]
PROT UR QL STRIP.AUTO: NEGATIVE
RBC #/AREA URNS AUTO: ABNORMAL /HPF
RBC UR QL AUTO: ABNORMAL
SP GR UR STRIP.AUTO: 1.01
SQUAMOUS #/AREA URNS AUTO: ABNORMAL /HPF
UROBILINOGEN UR STRIP-ACNC: 0.2
WBC #/AREA URNS AUTO: ABNORMAL /HPF

## 2023-07-24 PROCEDURE — 81001 URINALYSIS AUTO W/SCOPE: CPT | Performed by: NURSE PRACTITIONER

## 2023-07-24 PROCEDURE — 87088 URINE BACTERIA CULTURE: CPT | Performed by: NURSE PRACTITIONER

## 2023-07-24 PROCEDURE — 87077 CULTURE AEROBIC IDENTIFY: CPT | Performed by: NURSE PRACTITIONER

## 2023-07-26 LAB — BACTERIA UR CULT: ABNORMAL

## 2023-07-27 ENCOUNTER — LAB REQUISITION (OUTPATIENT)
Dept: LAB | Facility: HOSPITAL | Age: 88
End: 2023-07-27
Payer: MEDICARE

## 2023-07-27 DIAGNOSIS — R79.9 ABNORMAL FINDING OF BLOOD CHEMISTRY, UNSPECIFIED: ICD-10-CM

## 2023-07-27 LAB
ANION GAP SERPL CALC-SCNC: 11 MEQ/L
BASOPHILS # BLD AUTO: 0.03 X10(3)/MCL
BASOPHILS NFR BLD AUTO: 0.4 %
BUN SERPL-MCNC: 18.6 MG/DL (ref 9.8–20.1)
CALCIUM SERPL-MCNC: 9.2 MG/DL (ref 8.4–10.2)
CHLORIDE SERPL-SCNC: 105 MMOL/L (ref 98–107)
CO2 SERPL-SCNC: 26 MMOL/L (ref 23–31)
CREAT SERPL-MCNC: 0.79 MG/DL (ref 0.55–1.02)
CREAT/UREA NIT SERPL: 24
EOSINOPHIL # BLD AUTO: 0.21 X10(3)/MCL (ref 0–0.9)
EOSINOPHIL NFR BLD AUTO: 3 %
ERYTHROCYTE [DISTWIDTH] IN BLOOD BY AUTOMATED COUNT: 15.4 % (ref 11.5–17)
GFR SERPLBLD CREATININE-BSD FMLA CKD-EPI: >60 MLS/MIN/1.73/M2
GLUCOSE SERPL-MCNC: 94 MG/DL (ref 82–115)
HCT VFR BLD AUTO: 40.9 % (ref 37–47)
HGB BLD-MCNC: 12.9 G/DL (ref 12–16)
IMM GRANULOCYTES # BLD AUTO: 0.03 X10(3)/MCL (ref 0–0.04)
IMM GRANULOCYTES NFR BLD AUTO: 0.4 %
LYMPHOCYTES # BLD AUTO: 2.8 X10(3)/MCL (ref 0.6–4.6)
LYMPHOCYTES NFR BLD AUTO: 39.8 %
MCH RBC QN AUTO: 29.8 PG (ref 27–31)
MCHC RBC AUTO-ENTMCNC: 31.5 G/DL (ref 33–36)
MCV RBC AUTO: 94.5 FL (ref 80–94)
MONOCYTES # BLD AUTO: 0.65 X10(3)/MCL (ref 0.1–1.3)
MONOCYTES NFR BLD AUTO: 9.2 %
NEUTROPHILS # BLD AUTO: 3.31 X10(3)/MCL (ref 2.1–9.2)
NEUTROPHILS NFR BLD AUTO: 47.2 %
NRBC BLD AUTO-RTO: 0 %
PLATELET # BLD AUTO: 190 X10(3)/MCL (ref 130–400)
PMV BLD AUTO: 10.1 FL (ref 7.4–10.4)
POTASSIUM SERPL-SCNC: 4.3 MMOL/L (ref 3.5–5.1)
RBC # BLD AUTO: 4.33 X10(6)/MCL (ref 4.2–5.4)
SODIUM SERPL-SCNC: 142 MMOL/L (ref 136–145)
WBC # SPEC AUTO: 7.03 X10(3)/MCL (ref 4.5–11.5)

## 2023-07-27 PROCEDURE — 80048 BASIC METABOLIC PNL TOTAL CA: CPT | Performed by: NURSE PRACTITIONER

## 2023-07-27 PROCEDURE — 85025 COMPLETE CBC W/AUTO DIFF WBC: CPT | Performed by: NURSE PRACTITIONER

## 2023-09-08 PROCEDURE — 87088 URINE BACTERIA CULTURE: CPT | Performed by: NURSE PRACTITIONER

## 2023-09-08 PROCEDURE — 81003 URINALYSIS AUTO W/O SCOPE: CPT | Performed by: NURSE PRACTITIONER

## 2023-09-09 ENCOUNTER — LAB REQUISITION (OUTPATIENT)
Dept: LAB | Facility: HOSPITAL | Age: 88
End: 2023-09-09
Payer: MEDICARE

## 2023-09-09 DIAGNOSIS — R41.82 ALTERED MENTAL STATUS, UNSPECIFIED: ICD-10-CM

## 2023-09-09 LAB
APPEARANCE UR: CLEAR
BILIRUB UR QL STRIP.AUTO: NEGATIVE
COLOR UR: YELLOW
GLUCOSE UR QL STRIP.AUTO: NEGATIVE
KETONES UR QL STRIP.AUTO: NEGATIVE
LEUKOCYTE ESTERASE UR QL STRIP.AUTO: NEGATIVE
NITRITE UR QL STRIP.AUTO: NEGATIVE
PH UR STRIP.AUTO: 5.5 [PH]
PROT UR QL STRIP.AUTO: NEGATIVE
RBC UR QL AUTO: NEGATIVE
SP GR UR STRIP.AUTO: >=1.03 (ref 1–1.03)
UROBILINOGEN UR STRIP-ACNC: 0.2

## 2023-09-11 LAB — BACTERIA UR CULT: NORMAL

## 2023-10-10 ENCOUNTER — LAB REQUISITION (OUTPATIENT)
Dept: LAB | Facility: HOSPITAL | Age: 88
End: 2023-10-10
Payer: MEDICARE

## 2023-10-10 DIAGNOSIS — R79.9 ABNORMAL FINDING OF BLOOD CHEMISTRY, UNSPECIFIED: ICD-10-CM

## 2023-10-10 LAB
ANION GAP SERPL CALC-SCNC: 14 MEQ/L
APPEARANCE UR: CLEAR
BACTERIA #/AREA URNS AUTO: NORMAL /HPF
BASOPHILS # BLD AUTO: 0.05 X10(3)/MCL
BASOPHILS NFR BLD AUTO: 0.5 %
BILIRUB UR QL STRIP.AUTO: NEGATIVE
BUN SERPL-MCNC: 16.1 MG/DL (ref 9.8–20.1)
CALCIUM SERPL-MCNC: 8.9 MG/DL (ref 8.4–10.2)
CHLORIDE SERPL-SCNC: 104 MMOL/L (ref 98–107)
CO2 SERPL-SCNC: 24 MMOL/L (ref 23–31)
COLOR UR AUTO: ABNORMAL
CREAT SERPL-MCNC: 0.76 MG/DL (ref 0.55–1.02)
CREAT/UREA NIT SERPL: 21
EOSINOPHIL # BLD AUTO: 0.24 X10(3)/MCL (ref 0–0.9)
EOSINOPHIL NFR BLD AUTO: 2.6 %
ERYTHROCYTE [DISTWIDTH] IN BLOOD BY AUTOMATED COUNT: 14.3 % (ref 11.5–17)
GFR SERPLBLD CREATININE-BSD FMLA CKD-EPI: >60 MLS/MIN/1.73/M2
GLUCOSE SERPL-MCNC: 87 MG/DL (ref 82–115)
GLUCOSE UR QL STRIP.AUTO: NEGATIVE
HCT VFR BLD AUTO: 40.8 % (ref 37–47)
HGB BLD-MCNC: 13.2 G/DL (ref 12–16)
IMM GRANULOCYTES # BLD AUTO: 0.02 X10(3)/MCL (ref 0–0.04)
IMM GRANULOCYTES NFR BLD AUTO: 0.2 %
KETONES UR QL STRIP.AUTO: NEGATIVE
LEUKOCYTE ESTERASE UR QL STRIP.AUTO: NEGATIVE
LYMPHOCYTES # BLD AUTO: 4.02 X10(3)/MCL (ref 0.6–4.6)
LYMPHOCYTES NFR BLD AUTO: 43.3 %
MCH RBC QN AUTO: 30.7 PG (ref 27–31)
MCHC RBC AUTO-ENTMCNC: 32.4 G/DL (ref 33–36)
MCV RBC AUTO: 94.9 FL (ref 80–94)
MONOCYTES # BLD AUTO: 0.72 X10(3)/MCL (ref 0.1–1.3)
MONOCYTES NFR BLD AUTO: 7.8 %
NEUTROPHILS # BLD AUTO: 4.23 X10(3)/MCL (ref 2.1–9.2)
NEUTROPHILS NFR BLD AUTO: 45.6 %
NITRITE UR QL STRIP.AUTO: NEGATIVE
NRBC BLD AUTO-RTO: 0 %
PH UR STRIP.AUTO: 7 [PH]
PLATELET # BLD AUTO: 187 X10(3)/MCL (ref 130–400)
PMV BLD AUTO: 10.8 FL (ref 7.4–10.4)
POTASSIUM SERPL-SCNC: 4.3 MMOL/L (ref 3.5–5.1)
PROT UR QL STRIP.AUTO: NEGATIVE
RBC # BLD AUTO: 4.3 X10(6)/MCL (ref 4.2–5.4)
RBC #/AREA URNS AUTO: NORMAL /HPF
RBC UR QL AUTO: ABNORMAL
SODIUM SERPL-SCNC: 142 MMOL/L (ref 136–145)
SP GR UR STRIP.AUTO: 1.01 (ref 1–1.03)
SQUAMOUS #/AREA URNS AUTO: NORMAL /HPF
UROBILINOGEN UR STRIP-ACNC: 0.2
WBC # SPEC AUTO: 9.28 X10(3)/MCL (ref 4.5–11.5)
WBC #/AREA URNS AUTO: NORMAL /HPF

## 2023-10-10 PROCEDURE — 80048 BASIC METABOLIC PNL TOTAL CA: CPT | Performed by: NURSE PRACTITIONER

## 2023-10-10 PROCEDURE — 85025 COMPLETE CBC W/AUTO DIFF WBC: CPT | Performed by: NURSE PRACTITIONER

## 2023-10-10 PROCEDURE — 81001 URINALYSIS AUTO W/SCOPE: CPT | Performed by: NURSE PRACTITIONER

## 2023-10-17 ENCOUNTER — LAB REQUISITION (OUTPATIENT)
Dept: LAB | Facility: HOSPITAL | Age: 88
End: 2023-10-17
Payer: MEDICARE

## 2023-10-17 DIAGNOSIS — E03.9 HYPOTHYROIDISM, UNSPECIFIED: ICD-10-CM

## 2023-10-17 DIAGNOSIS — R79.9 ABNORMAL FINDING OF BLOOD CHEMISTRY, UNSPECIFIED: ICD-10-CM

## 2023-10-17 LAB
ALBUMIN SERPL-MCNC: 4 G/DL (ref 3.4–4.8)
ALBUMIN/GLOB SERPL: 1.4 RATIO (ref 1.1–2)
ALP SERPL-CCNC: 78 UNIT/L (ref 40–150)
ALT SERPL-CCNC: 20 UNIT/L (ref 0–55)
AST SERPL-CCNC: 28 UNIT/L (ref 5–34)
BASOPHILS # BLD AUTO: 0.07 X10(3)/MCL
BASOPHILS NFR BLD AUTO: 0.7 %
BILIRUB SERPL-MCNC: 0.4 MG/DL
BUN SERPL-MCNC: 25.1 MG/DL (ref 9.8–20.1)
CALCIUM SERPL-MCNC: 9.6 MG/DL (ref 8.4–10.2)
CHLORIDE SERPL-SCNC: 105 MMOL/L (ref 98–107)
CHOLEST SERPL-MCNC: 287 MG/DL
CHOLEST/HDLC SERPL: 10 {RATIO} (ref 0–5)
CO2 SERPL-SCNC: 21 MMOL/L (ref 23–31)
CREAT SERPL-MCNC: 0.9 MG/DL (ref 0.55–1.02)
EOSINOPHIL # BLD AUTO: 0.19 X10(3)/MCL (ref 0–0.9)
EOSINOPHIL NFR BLD AUTO: 1.9 %
ERYTHROCYTE [DISTWIDTH] IN BLOOD BY AUTOMATED COUNT: 14.2 % (ref 11.5–17)
GFR SERPLBLD CREATININE-BSD FMLA CKD-EPI: >60 MLS/MIN/1.73/M2
GLOBULIN SER-MCNC: 2.8 GM/DL (ref 2.4–3.5)
GLUCOSE SERPL-MCNC: 105 MG/DL (ref 82–115)
HCT VFR BLD AUTO: 46.6 % (ref 37–47)
HDLC SERPL-MCNC: 30 MG/DL (ref 35–60)
HGB BLD-MCNC: 14.6 G/DL (ref 12–16)
IMM GRANULOCYTES # BLD AUTO: 0.03 X10(3)/MCL (ref 0–0.04)
IMM GRANULOCYTES NFR BLD AUTO: 0.3 %
LDLC SERPL CALC-MCNC: 178 MG/DL (ref 50–140)
LYMPHOCYTES # BLD AUTO: 4.07 X10(3)/MCL (ref 0.6–4.6)
LYMPHOCYTES NFR BLD AUTO: 41.4 %
MCH RBC QN AUTO: 30.7 PG (ref 27–31)
MCHC RBC AUTO-ENTMCNC: 31.3 G/DL (ref 33–36)
MCV RBC AUTO: 98.1 FL (ref 80–94)
MONOCYTES # BLD AUTO: 0.71 X10(3)/MCL (ref 0.1–1.3)
MONOCYTES NFR BLD AUTO: 7.2 %
NEUTROPHILS # BLD AUTO: 4.77 X10(3)/MCL (ref 2.1–9.2)
NEUTROPHILS NFR BLD AUTO: 48.5 %
NRBC BLD AUTO-RTO: 0 %
PLATELET # BLD AUTO: 149 X10(3)/MCL (ref 130–400)
PMV BLD AUTO: 11.6 FL (ref 7.4–10.4)
POTASSIUM SERPL-SCNC: 4.8 MMOL/L (ref 3.5–5.1)
PROT SERPL-MCNC: 6.8 GM/DL (ref 5.8–7.6)
RBC # BLD AUTO: 4.75 X10(6)/MCL (ref 4.2–5.4)
SODIUM SERPL-SCNC: 140 MMOL/L (ref 136–145)
TRIGL SERPL-MCNC: 395 MG/DL (ref 37–140)
TSH SERPL-ACNC: 0.35 UIU/ML (ref 0.35–4.94)
VLDLC SERPL CALC-MCNC: 79 MG/DL
WBC # SPEC AUTO: 9.84 X10(3)/MCL (ref 4.5–11.5)

## 2023-10-17 PROCEDURE — 85025 COMPLETE CBC W/AUTO DIFF WBC: CPT | Performed by: NURSE PRACTITIONER

## 2023-10-17 PROCEDURE — 84443 ASSAY THYROID STIM HORMONE: CPT | Performed by: NURSE PRACTITIONER

## 2023-10-17 PROCEDURE — 80061 LIPID PANEL: CPT | Performed by: NURSE PRACTITIONER

## 2023-10-17 PROCEDURE — 80053 COMPREHEN METABOLIC PANEL: CPT | Performed by: NURSE PRACTITIONER

## 2023-10-18 LAB — T4 FREE SERPL-MCNC: 1.6 NG/DL (ref 0.7–1.48)

## 2023-10-18 PROCEDURE — 84439 ASSAY OF FREE THYROXINE: CPT | Performed by: NURSE PRACTITIONER

## 2024-04-12 ENCOUNTER — LAB REQUISITION (OUTPATIENT)
Dept: LAB | Facility: HOSPITAL | Age: 89
End: 2024-04-12
Payer: MEDICARE

## 2024-04-12 DIAGNOSIS — R55 SYNCOPE AND COLLAPSE: ICD-10-CM

## 2024-04-12 LAB
ALBUMIN SERPL-MCNC: 3.6 G/DL (ref 3.4–4.8)
ALBUMIN/GLOB SERPL: 1.2 RATIO (ref 1.1–2)
ALP SERPL-CCNC: 96 UNIT/L (ref 40–150)
ALT SERPL-CCNC: 14 UNIT/L (ref 0–55)
AST SERPL-CCNC: 20 UNIT/L (ref 5–34)
BASOPHILS # BLD AUTO: 0.05 X10(3)/MCL
BASOPHILS NFR BLD AUTO: 0.5 %
BILIRUB SERPL-MCNC: 0.4 MG/DL
BUN SERPL-MCNC: 24 MG/DL (ref 9.8–20.1)
CALCIUM SERPL-MCNC: 9.4 MG/DL (ref 8.4–10.2)
CHLORIDE SERPL-SCNC: 105 MMOL/L (ref 98–107)
CO2 SERPL-SCNC: 27 MMOL/L (ref 23–31)
CREAT SERPL-MCNC: 0.86 MG/DL (ref 0.55–1.02)
EOSINOPHIL # BLD AUTO: 0.1 X10(3)/MCL (ref 0–0.9)
EOSINOPHIL NFR BLD AUTO: 0.9 %
ERYTHROCYTE [DISTWIDTH] IN BLOOD BY AUTOMATED COUNT: 13.7 % (ref 11.5–17)
GFR SERPLBLD CREATININE-BSD FMLA CKD-EPI: >60 MLS/MIN/1.73/M2
GLOBULIN SER-MCNC: 2.9 GM/DL (ref 2.4–3.5)
GLUCOSE SERPL-MCNC: 119 MG/DL (ref 82–115)
HCT VFR BLD AUTO: 41.9 % (ref 37–47)
HGB BLD-MCNC: 13.7 G/DL (ref 12–16)
IMM GRANULOCYTES # BLD AUTO: 0.04 X10(3)/MCL (ref 0–0.04)
IMM GRANULOCYTES NFR BLD AUTO: 0.4 %
LYMPHOCYTES # BLD AUTO: 4.75 X10(3)/MCL (ref 0.6–4.6)
LYMPHOCYTES NFR BLD AUTO: 42.8 %
MCH RBC QN AUTO: 30.1 PG (ref 27–31)
MCHC RBC AUTO-ENTMCNC: 32.7 G/DL (ref 33–36)
MCV RBC AUTO: 92.1 FL (ref 80–94)
MONOCYTES # BLD AUTO: 0.87 X10(3)/MCL (ref 0.1–1.3)
MONOCYTES NFR BLD AUTO: 7.8 %
NEUTROPHILS # BLD AUTO: 5.28 X10(3)/MCL (ref 2.1–9.2)
NEUTROPHILS NFR BLD AUTO: 47.6 %
PLATELET # BLD AUTO: 279 X10(3)/MCL (ref 130–400)
PMV BLD AUTO: 10.2 FL (ref 7.4–10.4)
POTASSIUM SERPL-SCNC: 4.4 MMOL/L (ref 3.5–5.1)
PROT SERPL-MCNC: 6.5 GM/DL (ref 5.8–7.6)
RBC # BLD AUTO: 4.55 X10(6)/MCL (ref 4.2–5.4)
SODIUM SERPL-SCNC: 141 MMOL/L (ref 136–145)
WBC # SPEC AUTO: 11.09 X10(3)/MCL (ref 4.5–11.5)

## 2024-04-12 PROCEDURE — 80053 COMPREHEN METABOLIC PANEL: CPT | Performed by: NURSE PRACTITIONER

## 2024-04-12 PROCEDURE — 85025 COMPLETE CBC W/AUTO DIFF WBC: CPT | Performed by: NURSE PRACTITIONER

## 2024-09-21 ENCOUNTER — HOSPITAL ENCOUNTER (EMERGENCY)
Facility: HOSPITAL | Age: 89
Discharge: HOME | End: 2024-09-21
Attending: FAMILY MEDICINE
Payer: MEDICARE

## 2024-09-21 VITALS
BODY MASS INDEX: 22.88 KG/M2 | TEMPERATURE: 98 F | WEIGHT: 134 LBS | HEIGHT: 64 IN | OXYGEN SATURATION: 96 % | RESPIRATION RATE: 20 BRPM | DIASTOLIC BLOOD PRESSURE: 91 MMHG | SYSTOLIC BLOOD PRESSURE: 187 MMHG | HEART RATE: 94 BPM

## 2024-09-21 DIAGNOSIS — S09.90XA INJURY OF HEAD, INITIAL ENCOUNTER: ICD-10-CM
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
## 2024-09-21 DIAGNOSIS — N30.01 ACUTE CYSTITIS WITH HEMATURIA: ICD-10-CM

## 2024-09-21 LAB
ALBUMIN SERPL-MCNC: 3.9 G/DL (ref 3.4–4.8)
ALBUMIN/GLOB SERPL: 1.3 RATIO (ref 1.1–2)
ALP SERPL-CCNC: 92 UNIT/L (ref 40–150)
ALT SERPL-CCNC: 15 UNIT/L (ref 0–55)
ANION GAP SERPL CALC-SCNC: 11 MEQ/L
AST SERPL-CCNC: 18 UNIT/L (ref 5–34)
BACTERIA #/AREA URNS AUTO: ABNORMAL /HPF
BASOPHILS # BLD AUTO: 0.03 X10(3)/MCL
BASOPHILS NFR BLD AUTO: 0.3 %
BILIRUB SERPL-MCNC: 0.5 MG/DL
BILIRUB UR QL STRIP.AUTO: NEGATIVE
BUN SERPL-MCNC: 30 MG/DL (ref 9.8–20.1)
CALCIUM SERPL-MCNC: 9.6 MG/DL (ref 8.4–10.2)
CHLORIDE SERPL-SCNC: 109 MMOL/L (ref 98–107)
CLARITY UR: CLEAR
CO2 SERPL-SCNC: 23 MMOL/L (ref 23–31)
COLOR UR AUTO: YELLOW
CREAT SERPL-MCNC: 1.23 MG/DL (ref 0.55–1.02)
CREAT/UREA NIT SERPL: 24
EOSINOPHIL # BLD AUTO: 0.17 X10(3)/MCL (ref 0–0.9)
EOSINOPHIL NFR BLD AUTO: 1.7 %
ERYTHROCYTE [DISTWIDTH] IN BLOOD BY AUTOMATED COUNT: 15.1 % (ref 11.5–17)
GFR SERPLBLD CREATININE-BSD FMLA CKD-EPI: 42 ML/MIN/1.73/M2
GLOBULIN SER-MCNC: 3 GM/DL (ref 2.4–3.5)
GLUCOSE SERPL-MCNC: 113 MG/DL (ref 82–115)
GLUCOSE UR QL STRIP: NEGATIVE
HCT VFR BLD AUTO: 42.8 % (ref 37–47)
HGB BLD-MCNC: 13.6 G/DL (ref 12–16)
HGB UR QL STRIP: ABNORMAL
IMM GRANULOCYTES # BLD AUTO: 0.03 X10(3)/MCL (ref 0–0.04)
IMM GRANULOCYTES NFR BLD AUTO: 0.3 %
KETONES UR QL STRIP: NEGATIVE
LEUKOCYTE ESTERASE UR QL STRIP: NEGATIVE
LYMPHOCYTES # BLD AUTO: 3.96 X10(3)/MCL (ref 0.6–4.6)
LYMPHOCYTES NFR BLD AUTO: 39.2 %
MCH RBC QN AUTO: 29.2 PG (ref 27–31)
MCHC RBC AUTO-ENTMCNC: 31.8 G/DL (ref 33–36)
MCV RBC AUTO: 92 FL (ref 80–94)
MONOCYTES # BLD AUTO: 0.86 X10(3)/MCL (ref 0.1–1.3)
MONOCYTES NFR BLD AUTO: 8.5 %
NEUTROPHILS # BLD AUTO: 5.05 X10(3)/MCL (ref 2.1–9.2)
NEUTROPHILS NFR BLD AUTO: 50 %
NITRITE UR QL STRIP: POSITIVE
PH UR STRIP: 5.5 [PH]
PLATELET # BLD AUTO: 191 X10(3)/MCL (ref 130–400)
PMV BLD AUTO: 10.6 FL (ref 7.4–10.4)
POTASSIUM SERPL-SCNC: 4.3 MMOL/L (ref 3.5–5.1)
PROT SERPL-MCNC: 6.9 GM/DL (ref 5.8–7.6)
PROT UR QL STRIP: NEGATIVE
RBC # BLD AUTO: 4.65 X10(6)/MCL (ref 4.2–5.4)
RBC #/AREA URNS AUTO: ABNORMAL /HPF
SODIUM SERPL-SCNC: 143 MMOL/L (ref 136–145)
SP GR UR STRIP.AUTO: 1.02 (ref 1–1.03)
SQUAMOUS #/AREA URNS AUTO: ABNORMAL /HPF
UROBILINOGEN UR STRIP-ACNC: 0.2
WBC # BLD AUTO: 10.1 X10(3)/MCL (ref 4.5–11.5)
WBC #/AREA URNS AUTO: ABNORMAL /HPF

## 2024-09-21 PROCEDURE — 80053 COMPREHEN METABOLIC PANEL: CPT | Performed by: NURSE PRACTITIONER

## 2024-09-21 PROCEDURE — 99284 EMERGENCY DEPT VISIT MOD MDM: CPT | Mod: 25

## 2024-09-21 PROCEDURE — 81003 URINALYSIS AUTO W/O SCOPE: CPT | Performed by: NURSE PRACTITIONER

## 2024-09-21 PROCEDURE — 81001 URINALYSIS AUTO W/SCOPE: CPT | Performed by: NURSE PRACTITIONER

## 2024-09-21 PROCEDURE — 85025 COMPLETE CBC W/AUTO DIFF WBC: CPT | Performed by: NURSE PRACTITIONER

## 2024-09-21 RX ORDER — AZITHROMYCIN 250 MG/1
TABLET, FILM COATED ORAL
Qty: 6 TABLET | Refills: 0 | Status: SHIPPED | OUTPATIENT
Start: 2024-09-21 | End: 2024-09-26

## 2024-09-21 NOTE — ED PROVIDER NOTES
Encounter Date: 9/21/2024       History     Chief Complaint   Patient presents with    Fall     BIBA for fall from WC at NH   C/o head contusion   no LOC   on asa   hx denmentia       89-year-old female presents from nursing home with complaints of falling out of her wheelchair after bending over.  EMS reports no LOC patient does take a baby aspirin once a day and patient also has a history of dementia.  Patient does have a hematoma noted to right forehead with no laceration or abrasion.  Patient denies any nausea, vomiting dizziness.     The history is provided by the patient. The history is limited by the condition of the patient.     Review of patient's allergies indicates:   Allergen Reactions    Levofloxacin Other (See Comments)    Codeine Itching    Sulfa (sulfonamide antibiotics) Rash     History reviewed. No pertinent past medical history.  History reviewed. No pertinent surgical history.  No family history on file.     Review of Systems   Gastrointestinal:  Negative for abdominal pain, nausea and vomiting.   Neurological:  Positive for headaches. Negative for dizziness, tremors, seizures, syncope, facial asymmetry, speech difficulty, weakness, light-headedness and numbness.   All other systems reviewed and are negative.      Physical Exam     Initial Vitals [09/21/24 1622]   BP Pulse Resp Temp SpO2   (!) 189/72 83 16 98 °F (36.7 °C) 97 %      MAP       --         Physical Exam    Nursing note and vitals reviewed.  Constitutional: She appears well-developed and well-nourished.   HENT:   Head: Head is with abrasion.       Right Ear: External ear normal.   Left Ear: External ear normal.   Nose: Nose normal.   Mouth/Throat: Oropharynx is clear and moist.   Eyes: Conjunctivae and EOM are normal.   Neck: Trachea normal and phonation normal. Neck supple.   Normal range of motion.   Full passive range of motion without pain.     Cardiovascular:  Normal rate, regular rhythm, normal heart sounds and intact distal  pulses.           Pulmonary/Chest: Breath sounds normal.   Abdominal: Abdomen is soft. Bowel sounds are normal.   Musculoskeletal:         General: Normal range of motion.      Cervical back: Full passive range of motion without pain, normal range of motion and neck supple.     Neurological: She is alert. She has normal strength and normal reflexes. She displays normal reflexes. No cranial nerve deficit or sensory deficit. GCS eye subscore is 4. GCS verbal subscore is 5. GCS motor subscore is 6.   Skin: Skin is warm and dry. Capillary refill takes less than 2 seconds.   Psychiatric: She has a normal mood and affect. Her behavior is normal. Judgment and thought content normal.         ED Course   Procedures  Labs Reviewed   URINALYSIS, REFLEX TO URINE CULTURE - Abnormal       Result Value    Color, UA Yellow      Appearance, UA Clear      Specific Gravity, UA 1.025      pH, UA 5.5      Protein, UA Negative      Glucose, UA Negative      Ketones, UA Negative      Blood, UA Trace-Intact (*)     Bilirubin, UA Negative      Urobilinogen, UA 0.2      Nitrites, UA Positive (*)     Leukocyte Esterase, UA Negative     COMPREHENSIVE METABOLIC PANEL - Abnormal    Sodium 143      Potassium 4.3      Chloride 109 (*)     CO2 23      Glucose 113      Blood Urea Nitrogen 30.0 (*)     Creatinine 1.23 (*)     Calcium 9.6      Protein Total 6.9      Albumin 3.9      Globulin 3.0      Albumin/Globulin Ratio 1.3      Bilirubin Total 0.5      ALP 92      ALT 15      AST 18      eGFR 42      Anion Gap 11.0      BUN/Creatinine Ratio 24     CBC WITH DIFFERENTIAL - Abnormal    WBC 10.10      RBC 4.65      Hgb 13.6      Hct 42.8      MCV 92.0      MCH 29.2      MCHC 31.8 (*)     RDW 15.1      Platelet 191      MPV 10.6 (*)     Neut % 50.0      Lymph % 39.2      Mono % 8.5      Eos % 1.7      Basophil % 0.3      Lymph # 3.96      Neut # 5.05      Mono # 0.86      Eos # 0.17      Baso # 0.03      IG# 0.03      IG% 0.3     URINALYSIS,  MICROSCOPIC - Abnormal    Bacteria, UA Few (*)     RBC, UA 0-2      WBC, UA 0-2      Squamous Epithelial Cells, UA Occasional     CBC W/ AUTO DIFFERENTIAL    Narrative:     The following orders were created for panel order CBC auto differential.  Procedure                               Abnormality         Status                     ---------                               -----------         ------                     CBC with Differential[[id number]]       Abnormal            Final result                 Please view results for these tests on the individual orders.          Imaging Results              CT Head Without Contrast (Final result)  Result time 09/21/24 17:11:24      Final result by Goldy Chow MD (09/21/24 17:11:24)                   Impression:      1. No acute intracranial abnormality with remote infarct and changes of microangiopathy.  2. Preseptal soft tissue swelling on the right.      Electronically signed by: Goldy Chow MD  Date:    09/21/2024  Time:    17:11               Narrative:    EXAMINATION:  CT HEAD WITHOUT CONTRAST    CLINICAL HISTORY:  Head trauma, moderate-severe;    TECHNIQUE:  Low dose axial CT images obtained throughout the head without intravenous contrast. Sagittal and coronal reconstructions were performed.  An automated dose exposure technique was utilized this limits radiation does the patient.    COMPARISON:  None.    FINDINGS:  Remote infarct of the soft lesion gliosis is identified of the left occipital lobe.  Changes of microangiopathy.  No evidence for acute ischemic changes or infarct.  No intracranial hemorrhage or contusion.  No mass, mass effect midline shift, edema, or extra-axial fluid collection.  Age-appropriate volume loss with ex vacuo dilatation.    The globes are paranasal sinuses and mastoid air cells are well pneumatized with retention mucous cyst versus polyp within the right maxillary sinus within the floor.  Preseptal soft tissue swelling is identified  on the right without evidence for laceration or radiopaque foreign body.                                       CT Cervical Spine Without Contrast (Final result)  Result time 09/21/24 17:12:30      Final result by Goldy Chow MD (09/21/24 17:12:30)                   Impression:      Spondylotic changes of the cervical spine without evidence for fracture.  Limited evaluation secondary to positioning.      Electronically signed by: Goldy Chow MD  Date:    09/21/2024  Time:    17:12               Narrative:    EXAMINATION:  CT CERVICAL SPINE WITHOUT CONTRAST    CLINICAL HISTORY:  Neck trauma (Age >= 65y);    TECHNIQUE:  Low dose axial images, sagittal and coronal reformations were performed though the cervical spine.  Contrast was not administered.  An automated dose exposure technique was utilized this limits radiation does the patient.    COMPARISON:  None    FINDINGS:  Straightening the normal lordotic curvature.  Grade listhesis of C3 on C4 no evidence for compression deformity, fracture, or subluxation.  Predental space and prevertebral soft tissues are grossly normal.  Is maintained.  Intervertebral identified.  Multiple spondylotic changes are identified with multilevel neural foraminal narrowing secondary to uncovertebral facet arthropathy.  No overt evidence for central canal stenosis.    The soft tissues of neck are normal.  The lung apices are clear.                                       Medications - No data to display  Medical Decision Making  89-year-old female presents from nursing home with complaints of falling out of her wheelchair after bending over.  EMS reports no LOC patient does take a baby aspirin once a day and patient also has a history of dementia.  Patient does have a hematoma noted to right forehead with no laceration or abrasion.        Amount and/or Complexity of Data Reviewed  Labs: ordered.  Radiology: ordered.    Risk  Prescription drug management.  Risk Details: Azithromycin as  directed.  Follow up with primary care provider in 1-2 days for recheck. Tylenol or Motrin as needed for pain                          Medical Decision Making:   Differential Diagnosis:   Subdural hematoma, CVA, TIA, Aneurysm, Urinary Tract Infection             Clinical Impression:  Final diagnoses:  [W19.XXXA] Fall, initial encounter (Primary)  [S09.90XA] Injury of head, initial encounter  [N30.01] Acute cystitis with hematuria          ED Disposition Condition    Discharge Stable          ED Prescriptions       Medication Sig Dispense Start Date End Date Auth. Provider    azithromycin (Z-GIA) 250 MG tablet Take 2 tablets by mouth on day 1; Take 1 tablet by mouth on days 2-5 6 tablet 9/21/2024 9/26/2024 Jaycob Grant FNP          Follow-up Information       Follow up With Specialties Details Why Contact Info    Primary care provider of your choice  Schedule an appointment as soon as possible for a visit       [facility] - Emergency Dept Emergency Medicine  If symptoms worsen [address]  [facility] [address]  [phone]             Jaycob Grant FNP  09/21/24 4216

## 2024-10-11 ENCOUNTER — HOSPITAL ENCOUNTER (EMERGENCY)
Facility: HOSPITAL | Age: 89
End: 2024-10-12
Attending: EMERGENCY MEDICINE
Payer: MEDICARE

## 2024-10-11 DIAGNOSIS — S00.83XA CONTUSION OF FACE, INITIAL ENCOUNTER: ICD-10-CM

## 2024-10-11 DIAGNOSIS — S02.2XXA CLOSED FRACTURE OF NASAL BONE, INITIAL ENCOUNTER: ICD-10-CM
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
## 2024-10-11 PROCEDURE — 99284 EMERGENCY DEPT VISIT MOD MDM: CPT | Mod: 25

## 2024-10-11 RX ORDER — NAPROXEN 375 MG/1
375 TABLET ORAL 2 TIMES DAILY WITH MEALS
Qty: 14 TABLET | Refills: 0 | Status: SHIPPED | OUTPATIENT
Start: 2024-10-11 | End: 2024-10-18

## 2024-10-11 RX ORDER — AMOXICILLIN AND CLAVULANATE POTASSIUM 875; 125 MG/1; MG/1
1 TABLET, FILM COATED ORAL 2 TIMES DAILY
Qty: 14 TABLET | Refills: 0 | Status: SHIPPED | OUTPATIENT
Start: 2024-10-11 | End: 2024-10-18

## 2024-10-12 VITALS
OXYGEN SATURATION: 97 % | RESPIRATION RATE: 15 BRPM | HEART RATE: 68 BPM | SYSTOLIC BLOOD PRESSURE: 136 MMHG | DIASTOLIC BLOOD PRESSURE: 54 MMHG | TEMPERATURE: 98 F

## 2024-10-12 NOTE — ED PROVIDER NOTES
Encounter Date: 10/11/2024    SCRIBE #1 NOTE: I, Mesfin Danielsonbert, am scribing for, and in the presence of,  Elmer Redmond MD. I have scribed the following portions of the note - Other sections scribed: HPI, ROS, PE.       History     Chief Complaint   Patient presents with    Fall     Arrives [facility] unit [facility] from [facility] after glf and struck right side of face w/ contusion - gcs12 at baseline - takes asa denies thinners      89 y/o female with a hx of dementia presents to the ED from [facility] presents to the ED following a fall. Per RN after exam, pt was given nighttime medications by NH staff and was found on the floor a few minutes later. Fall was unwitnessed. Pt GCS13 at baseline due to hx of Alzheimer's and dementia. Pt states she is having pain to her right eyebrow. She denies any neck pain, abdominal pain, or hip pain.     The history is provided by the patient. No  was used.     Review of patient's allergies indicates:   Allergen Reactions    Levofloxacin Other (See Comments)    Codeine Itching    Sulfa (sulfonamide antibiotics) Rash     No past medical history on file.  No past surgical history on file.  No family history on file.     Review of Systems   Constitutional:  Negative for chills and fever.   HENT:          Right eyebrow pain   Respiratory:  Negative for cough and shortness of breath.    Cardiovascular:  Negative for chest pain.   Gastrointestinal:  Negative for abdominal pain, nausea and vomiting.   Musculoskeletal:  Negative for myalgias and neck pain.        No hip pain   Neurological:  Negative for syncope and headaches.       Physical Exam     Initial Vitals [10/11/24 2056]   BP Pulse Resp Temp SpO2   (!) 148/80 72 18 97.5 °F (36.4 °C) 97 %      MAP       --         Physical Exam    Nursing note and vitals reviewed.  Constitutional: She appears well-developed and well-nourished. No distress.   HENT:   Head: Normocephalic.   Swelling and bruising surrounding the right  eye. Dried blood in the right nare. No supple hematoma. Right periorbital contusion with no laceration.    Eyes: Conjunctivae and EOM are normal.   EOM intact   Cardiovascular:  Normal rate and intact distal pulses.           Pulmonary/Chest: No respiratory distress. She has no rhonchi.   Abdominal: Abdomen is soft. Bowel sounds are normal. There is no abdominal tenderness. There is no rebound and no guarding.   Musculoskeletal:         General: No edema.      Comments: No cervical spine tenderness. No pain with palpation of the shoulder or clavicle. No pain with range of motion of the hips.      Neurological: She is alert. No cranial nerve deficit or sensory deficit.   Cranial nerves 2-12 grossly intact.    Skin: Skin is warm and dry.   Psychiatric:   Lack of facial expression         ED Course   Procedures  Labs Reviewed - No data to display       Imaging Results              CT Cervical Spine Without Contrast (Final result)  Result time 10/11/24 22:18:48      Final result by Tiburcio Bhatia MD (10/11/24 22:18:48)                   Impression:      No acute fracture or malalignment identified.    Degenerative changes.      Electronically signed by: Tiburcio Bhatia  Date:    10/11/2024  Time:    22:18               Narrative:    EXAMINATION:  CT CERVICAL SPINE WITHOUT CONTRAST    CLINICAL HISTORY:  Trauma.    TECHNIQUE:  Multidetector axial images were performed of the cervical spine without and.  Images were reconstructed.    Automated exposure control was utilized to minimize radiation dose.  .    COMPARISON:  None available.    FINDINGS:  Cervical vertebrae stature is preserved.  There is grade 1 anterolisthesis of C3 on C4 and C4 on C5.  There is chronic corticated ossification adjacent to the tip of the dens.  No acute fracture or malalignment identified.  There are multilevel degenerative changes which cause mild narrowing of the left neural foramen at C2-C3, mild narrowing left neural foramen at C3-C4,  marked narrowing of the left neural foramen at C4-C5, mild right and moderate narrowing of the left neural foramen at C5-C6 and moderate narrowing of left neural foramen at C6-C7.  There is no prevertebral soft tissue prominence.    This study does not exclude the possibility of intrathecal soft tissue, ligamentous or vascular injury.                                       CT Maxillofacial Without Contrast (Final result)  Result time 10/11/24 22:23:32      Final result by Tiburcio Bhatia MD (10/11/24 22:23:32)                   Impression:      Bilateral new likely acute angulated fracture deformities of the nasal bones.      Electronically signed by: Tiburcio Bhatia  Date:    10/11/2024  Time:    22:23               Narrative:    EXAMINATION:  CT MAXILLOFACIAL WITHOUT CONTRAST    CLINICAL HISTORY:  Facial trauma, blunt;    TECHNIQUE:  Multidetector axial images were performed maxillofacial without contrast and images reformatted.    Dose length product of 376 mGycm. Automated exposure control was utilized to minimize radiation dose.    COMPARISON:  CT brain September 21, 224    FINDINGS:  There are no fractures of the orbital walls. The globes are unremarkable and no intra-orbital inflammations or emphysema identified.    There are new likely acute angulated fracture deformities of the bilateral nasal bones seen on image 40 series 2.    There are no fractures of the pterygoids, zygomatic arches, paranasal sinuses walls or the mandibles.  There is mucoperiosteal thickening of the right maxillary sinus.  There is right maxillary antrostomy.                                       CT Head Without Contrast (Final result)  Result time 10/11/24 22:07:07      Final result by Tiburcio Bhatia MD (10/11/24 22:07:07)                   Impression:      1.  No acute intracranial findings identified.    2.  Left occipital lobe encephalomalacia, chronic microangiopathic ischemia and atrophy.      Electronically signed by: Tiburcio  Bhatia  Date:    10/11/2024  Time:    22:07               Narrative:    EXAMINATION:  CT HEAD WITHOUT CONTRAST    CLINICAL HISTORY:  Head trauma, minor (Age >= 65y);    TECHNIQUE:  Sequential axial images were performed of the brain without contrast.    Dose product length of 1081 mGycm. Automated exposure control was utilized to minimize radiation dose.    COMPARISON:  None available.    FINDINGS:  There is no intracranial mass effect, midline shift, hydrocephalus or hemorrhage. There is no sulcal effacement or low attenuation changes to suggest recent large vessel territory infarction.  There is left occipital lobe encephalomalacia related to old infarct.  Chronic appearing periventricular and subcortical white matter low attenuation changes are present and are consistent with chronic microangiopathic ischemia. The ventricular system and sulcal markings prominence is consistent with atrophy. There is no acute extra axial fluid collection.  There is no acute depressed skull fracture.    There is mild mucoperiosteal thickening of the right maxillary sinus.  Visualized paranasal sinuses are clear without mucosal thickening, polypoidal abnormality or air-fluid levels. Mastoid air cells aeration is optimal.                                       Medications - No data to display  Medical Decision Making  Differential diagnosis includes but is not limited to: contusion, intracranial hemorrhage, facial fractures, cervical fractures      Amount and/or Complexity of Data Reviewed  Independent Historian: caregiver     Details: Per RN after exam, pt was given nighttime medications by NH staff and was found on the floor a few minutes later. Fall was unwitnessed. Pt GCS13 at baseline due to hx of Alzheimer's and dementia.   Radiology: ordered.            Scribe Attestation:   Scribe #1: I performed the above scribed service and the documentation accurately describes the services I performed. I attest to the accuracy of the  note.    Attending Attestation:           Physician Attestation for Scribe:  Physician Attestation Statement for Scribe #1: I, Elmer Redmond MD, reviewed documentation, as scribed by Mesfin Jordan in my presence, and it is both accurate and complete.                      Patient was daughter was present in the emergency department.  Discussed ED workup and treatment plan and follow up instructions.  She expressed understanding              Clinical Impression:  Final diagnoses:  [W19.XXXA] Fall, initial encounter (Primary)  [S00.83XA] Contusion of face, initial encounter  [S02.2XXA] Closed fracture of nasal bone, initial encounter          ED Disposition Condition    Discharge Stable          ED Prescriptions       Medication Sig Dispense Start Date End Date Auth. Provider    amoxicillin-clavulanate 875-125mg (AUGMENTIN) 875-125 mg per tablet Take 1 tablet by mouth 2 (two) times daily. for 7 days 14 tablet 10/11/2024 10/18/2024 Elmer Redmond MD    naproxen (NAPROSYN) 375 MG tablet Take 1 tablet (375 mg total) by mouth 2 (two) times daily with meals. for 7 days 14 tablet 10/11/2024 10/18/2024 Elmer Redmond MD          Follow-up Information       Follow up With Specialties Details Why Contact Info    Primary care provider   If your symptoms worsen or change please return to the emergency department for re-evaluation Call your primary care provider to schedule a follow-up appointment within a week    Dong Plata MD Otolaryngology  If you have any difficulty breathing through your nose or not happy with the appearance you can follow up with facial plastic surgery [address]  [address]  [facility] [address]  [phone]               Elmer Redmond MD  10/11/24 1598       Elmer Redmond MD  10/11/24 8073

## 2024-10-12 NOTE — ED NOTES
Called [facility] in [place] [phone] to adv pt is ready for discharge. Spoke with UGO Rothman who took pt report. Adv they will call [facility] for pt transport.

## 2025-04-03 ENCOUNTER — ANESTHESIA (OUTPATIENT)
Dept: SURGERY | Facility: HOSPITAL | Age: 89
End: 2025-04-03
Payer: MEDICARE

## 2025-04-03 ENCOUNTER — HOSPITAL ENCOUNTER (INPATIENT)
Facility: HOSPITAL | Age: 89
LOS: 2 days | Discharge: NURSING, REHAB OR LONG-TERM CARE | DRG: 378 | End: 2025-04-06
Attending: INTERNAL MEDICINE | Admitting: INTERNAL MEDICINE
Payer: MEDICARE

## 2025-04-03 ENCOUNTER — ANESTHESIA EVENT (OUTPATIENT)
Dept: SURGERY | Facility: HOSPITAL | Age: 89
End: 2025-04-03
Payer: MEDICARE

## 2025-04-03 DIAGNOSIS — K92.2 GASTROINTESTINAL HEMORRHAGE, UNSPECIFIED GASTROINTESTINAL HEMORRHAGE TYPE: Primary | ICD-10-CM

## 2025-04-03 DIAGNOSIS — R31.9 URINARY TRACT INFECTION WITH HEMATURIA, SITE UNSPECIFIED: ICD-10-CM

## 2025-04-03 DIAGNOSIS — N39.0 URINARY TRACT INFECTION WITH HEMATURIA, SITE UNSPECIFIED: ICD-10-CM

## 2025-04-03 DIAGNOSIS — D62 ACUTE BLOOD LOSS ANEMIA: ICD-10-CM

## 2025-04-03 DIAGNOSIS — F03.90 DEMENTIA, UNSPECIFIED DEMENTIA SEVERITY, UNSPECIFIED DEMENTIA TYPE, UNSPECIFIED WHETHER BEHAVIORAL, PSYCHOTIC, OR MOOD DISTURBANCE OR ANXIETY: ICD-10-CM

## 2025-04-03 LAB
ALBUMIN SERPL-MCNC: 2.8 G/DL (ref 3.4–4.8)
ALBUMIN/GLOB SERPL: 1 RATIO (ref 1.1–2)
ALP SERPL-CCNC: 73 UNIT/L (ref 40–150)
ALT SERPL-CCNC: 10 UNIT/L (ref 0–55)
ANION GAP SERPL CALC-SCNC: 6 MEQ/L
AST SERPL-CCNC: 13 UNIT/L (ref 11–45)
BACTERIA #/AREA URNS AUTO: ABNORMAL /HPF
BASOPHILS # BLD AUTO: 0.01 X10(3)/MCL
BASOPHILS NFR BLD AUTO: 0.1 %
BILIRUB SERPL-MCNC: 0.4 MG/DL
BILIRUB UR QL STRIP.AUTO: NEGATIVE
BUN SERPL-MCNC: 31 MG/DL (ref 9.8–20.1)
CALCIUM SERPL-MCNC: 7.9 MG/DL (ref 8.4–10.2)
CHLORIDE SERPL-SCNC: 117 MMOL/L (ref 98–107)
CLARITY UR: CLEAR
CO2 SERPL-SCNC: 23 MMOL/L (ref 23–31)
COLOR UR AUTO: YELLOW
CREAT SERPL-MCNC: 0.77 MG/DL (ref 0.55–1.02)
CREAT/UREA NIT SERPL: 40
EOSINOPHIL # BLD AUTO: 0.17 X10(3)/MCL (ref 0–0.9)
EOSINOPHIL NFR BLD AUTO: 1.6 %
ERYTHROCYTE [DISTWIDTH] IN BLOOD BY AUTOMATED COUNT: 14.1 % (ref 11.5–17)
GFR SERPLBLD CREATININE-BSD FMLA CKD-EPI: >60 ML/MIN/1.73/M2
GLOBULIN SER-MCNC: 2.7 GM/DL (ref 2.4–3.5)
GLUCOSE SERPL-MCNC: 135 MG/DL (ref 82–115)
GLUCOSE UR QL STRIP: NEGATIVE
HCT VFR BLD AUTO: 29.9 % (ref 37–47)
HEMOCCULT SP1 STL QL: POSITIVE
HGB BLD-MCNC: 9.2 G/DL (ref 12–16)
HGB UR QL STRIP: ABNORMAL
IMM GRANULOCYTES # BLD AUTO: 0.03 X10(3)/MCL (ref 0–0.04)
IMM GRANULOCYTES NFR BLD AUTO: 0.3 %
INR PPP: 1.4
KETONES UR QL STRIP: ABNORMAL
LACTATE SERPL-SCNC: 1.8 MMOL/L (ref 0.5–2.2)
LEUKOCYTE ESTERASE UR QL STRIP: ABNORMAL
LIPASE SERPL-CCNC: 29 U/L
LYMPHOCYTES # BLD AUTO: 3.92 X10(3)/MCL (ref 0.6–4.6)
LYMPHOCYTES NFR BLD AUTO: 37.3 %
MCH RBC QN AUTO: 30.7 PG (ref 27–31)
MCHC RBC AUTO-ENTMCNC: 30.8 G/DL (ref 33–36)
MCV RBC AUTO: 99.7 FL (ref 80–94)
MONOCYTES # BLD AUTO: 0.78 X10(3)/MCL (ref 0.1–1.3)
MONOCYTES NFR BLD AUTO: 7.4 %
NEUTROPHILS # BLD AUTO: 5.6 X10(3)/MCL (ref 2.1–9.2)
NEUTROPHILS NFR BLD AUTO: 53.3 %
NITRITE UR QL STRIP: NEGATIVE
PH UR STRIP: 7 [PH]
PLATELET # BLD AUTO: 180 X10(3)/MCL (ref 130–400)
PMV BLD AUTO: 10.4 FL (ref 7.4–10.4)
POTASSIUM SERPL-SCNC: 3.8 MMOL/L (ref 3.5–5.1)
PROT SERPL-MCNC: 5.5 GM/DL (ref 5.8–7.6)
PROT UR QL STRIP: ABNORMAL
PROTHROMBIN TIME: 17.7 SECONDS (ref 12.4–14.9)
RBC # BLD AUTO: 3 X10(6)/MCL (ref 4.2–5.4)
RBC #/AREA URNS AUTO: ABNORMAL /HPF
SODIUM SERPL-SCNC: 146 MMOL/L (ref 136–145)
SP GR UR STRIP.AUTO: 1.02 (ref 1–1.03)
SQUAMOUS #/AREA URNS AUTO: ABNORMAL /HPF
UROBILINOGEN UR STRIP-ACNC: 1
WBC # BLD AUTO: 10.51 X10(3)/MCL (ref 4.5–11.5)
WBC #/AREA URNS AUTO: ABNORMAL /HPF

## 2025-04-03 PROCEDURE — 37000008 HC ANESTHESIA 1ST 15 MINUTES: Performed by: SURGERY

## 2025-04-03 PROCEDURE — 63600175 PHARM REV CODE 636 W HCPCS: Performed by: INTERNAL MEDICINE

## 2025-04-03 PROCEDURE — 63600175 PHARM REV CODE 636 W HCPCS: Performed by: EMERGENCY MEDICINE

## 2025-04-03 PROCEDURE — 25500020 PHARM REV CODE 255: Performed by: INTERNAL MEDICINE

## 2025-04-03 PROCEDURE — 96361 HYDRATE IV INFUSION ADD-ON: CPT

## 2025-04-03 PROCEDURE — 25000003 PHARM REV CODE 250: Performed by: INTERNAL MEDICINE

## 2025-04-03 PROCEDURE — 27000221 HC OXYGEN, UP TO 24 HOURS

## 2025-04-03 PROCEDURE — 43235 EGD DIAGNOSTIC BRUSH WASH: CPT | Performed by: SURGERY

## 2025-04-03 PROCEDURE — A4216 STERILE WATER/SALINE, 10 ML: HCPCS | Performed by: NURSE ANESTHETIST, CERTIFIED REGISTERED

## 2025-04-03 PROCEDURE — 82270 OCCULT BLOOD FECES: CPT | Performed by: INTERNAL MEDICINE

## 2025-04-03 PROCEDURE — 83605 ASSAY OF LACTIC ACID: CPT | Performed by: INTERNAL MEDICINE

## 2025-04-03 PROCEDURE — 81003 URINALYSIS AUTO W/O SCOPE: CPT | Performed by: INTERNAL MEDICINE

## 2025-04-03 PROCEDURE — G0378 HOSPITAL OBSERVATION PER HR: HCPCS

## 2025-04-03 PROCEDURE — 96374 THER/PROPH/DIAG INJ IV PUSH: CPT

## 2025-04-03 PROCEDURE — 0DJ08ZZ INSPECTION OF UPPER INTESTINAL TRACT, VIA NATURAL OR ARTIFICIAL OPENING ENDOSCOPIC: ICD-10-PCS | Performed by: SURGERY

## 2025-04-03 PROCEDURE — 96375 TX/PRO/DX INJ NEW DRUG ADDON: CPT

## 2025-04-03 PROCEDURE — 85610 PROTHROMBIN TIME: CPT | Performed by: INTERNAL MEDICINE

## 2025-04-03 PROCEDURE — 36415 COLL VENOUS BLD VENIPUNCTURE: CPT | Performed by: INTERNAL MEDICINE

## 2025-04-03 PROCEDURE — 99285 EMERGENCY DEPT VISIT HI MDM: CPT | Mod: 25

## 2025-04-03 PROCEDURE — 25000003 PHARM REV CODE 250: Performed by: NURSE ANESTHETIST, CERTIFIED REGISTERED

## 2025-04-03 PROCEDURE — 37000009 HC ANESTHESIA EA ADD 15 MINS: Performed by: SURGERY

## 2025-04-03 PROCEDURE — 94761 N-INVAS EAR/PLS OXIMETRY MLT: CPT

## 2025-04-03 PROCEDURE — 63600175 PHARM REV CODE 636 W HCPCS: Performed by: NURSE ANESTHETIST, CERTIFIED REGISTERED

## 2025-04-03 PROCEDURE — 87040 BLOOD CULTURE FOR BACTERIA: CPT | Performed by: INTERNAL MEDICINE

## 2025-04-03 PROCEDURE — 80053 COMPREHEN METABOLIC PANEL: CPT | Performed by: INTERNAL MEDICINE

## 2025-04-03 PROCEDURE — 83690 ASSAY OF LIPASE: CPT | Performed by: INTERNAL MEDICINE

## 2025-04-03 PROCEDURE — 85025 COMPLETE CBC W/AUTO DIFF WBC: CPT | Performed by: INTERNAL MEDICINE

## 2025-04-03 RX ORDER — RIVASTIGMINE 13.3 MG/24H
1 PATCH, EXTENDED RELEASE TRANSDERMAL DAILY
COMMUNITY
Start: 2025-03-24

## 2025-04-03 RX ORDER — ACETAMINOPHEN 325 MG/1
325 TABLET ORAL 4 TIMES DAILY
COMMUNITY

## 2025-04-03 RX ORDER — CEFTRIAXONE 1 G/1
1 INJECTION, POWDER, FOR SOLUTION INTRAMUSCULAR; INTRAVENOUS
Status: COMPLETED | OUTPATIENT
Start: 2025-04-03 | End: 2025-04-03

## 2025-04-03 RX ORDER — SODIUM CHLORIDE 0.9 % (FLUSH) 0.9 %
10 SYRINGE (ML) INJECTION
Status: DISCONTINUED | OUTPATIENT
Start: 2025-04-03 | End: 2025-04-06 | Stop reason: HOSPADM

## 2025-04-03 RX ORDER — LIDOCAINE HYDROCHLORIDE 20 MG/ML
INJECTION, SOLUTION EPIDURAL; INFILTRATION; INTRACAUDAL; PERINEURAL
Status: DISCONTINUED | OUTPATIENT
Start: 2025-04-03 | End: 2025-04-03

## 2025-04-03 RX ORDER — PANTOPRAZOLE SODIUM 40 MG/10ML
80 INJECTION, POWDER, LYOPHILIZED, FOR SOLUTION INTRAVENOUS
Status: COMPLETED | OUTPATIENT
Start: 2025-04-03 | End: 2025-04-03

## 2025-04-03 RX ORDER — CLONIDINE HYDROCHLORIDE 0.1 MG/1
0.1 TABLET ORAL EVERY 8 HOURS PRN
COMMUNITY
Start: 2024-11-20

## 2025-04-03 RX ORDER — PROPOFOL 10 MG/ML
VIAL (ML) INTRAVENOUS
Status: DISCONTINUED | OUTPATIENT
Start: 2025-04-03 | End: 2025-04-03

## 2025-04-03 RX ORDER — VENLAFAXINE HYDROCHLORIDE 150 MG/1
150 CAPSULE, EXTENDED RELEASE ORAL DAILY
COMMUNITY

## 2025-04-03 RX ORDER — POLYETHYLENE GLYCOL 3350 17 G/17G
17 POWDER, FOR SOLUTION ORAL DAILY
COMMUNITY

## 2025-04-03 RX ORDER — CARVEDILOL 12.5 MG/1
12.5 TABLET ORAL 2 TIMES DAILY
COMMUNITY
Start: 2025-03-25

## 2025-04-03 RX ORDER — SODIUM CHLORIDE, SODIUM LACTATE, POTASSIUM CHLORIDE, CALCIUM CHLORIDE 600; 310; 30; 20 MG/100ML; MG/100ML; MG/100ML; MG/100ML
INJECTION, SOLUTION INTRAVENOUS CONTINUOUS
Status: DISCONTINUED | OUTPATIENT
Start: 2025-04-03 | End: 2025-04-03

## 2025-04-03 RX ORDER — CHOLECALCIFEROL (VITAMIN D3) 25 MCG
1 TABLET ORAL DAILY
COMMUNITY

## 2025-04-03 RX ORDER — FAMOTIDINE 20 MG/1
20 TABLET, FILM COATED ORAL DAILY
Status: ON HOLD | COMMUNITY
Start: 2025-03-31 | End: 2025-04-06 | Stop reason: HOSPADM

## 2025-04-03 RX ORDER — IOPAMIDOL 755 MG/ML
100 INJECTION, SOLUTION INTRAVASCULAR
Status: COMPLETED | OUTPATIENT
Start: 2025-04-03 | End: 2025-04-03

## 2025-04-03 RX ORDER — NAPROXEN SODIUM 220 MG/1
81 TABLET, FILM COATED ORAL DAILY
COMMUNITY

## 2025-04-03 RX ORDER — ATORVASTATIN CALCIUM 20 MG/1
20 TABLET, FILM COATED ORAL NIGHTLY
COMMUNITY
Start: 2025-03-31

## 2025-04-03 RX ORDER — SODIUM CHLORIDE 9 MG/ML
INJECTION, SOLUTION INTRAVENOUS CONTINUOUS
Status: DISCONTINUED | OUTPATIENT
Start: 2025-04-03 | End: 2025-04-06 | Stop reason: HOSPADM

## 2025-04-03 RX ORDER — ACYCLOVIR 400 MG/1
400 TABLET ORAL 2 TIMES DAILY
COMMUNITY
Start: 2025-03-27

## 2025-04-03 RX ORDER — VALSARTAN 40 MG/1
40 TABLET ORAL DAILY
COMMUNITY
Start: 2025-03-21

## 2025-04-03 RX ORDER — MEMANTINE HYDROCHLORIDE 10 MG/1
10 TABLET ORAL 2 TIMES DAILY
COMMUNITY
Start: 2025-03-25

## 2025-04-03 RX ORDER — CRANBERRY FRUIT 450 MG
1 TABLET ORAL NIGHTLY
COMMUNITY

## 2025-04-03 RX ORDER — RISPERIDONE 0.25 MG/1
0.25 TABLET ORAL NIGHTLY
COMMUNITY
Start: 2025-04-01

## 2025-04-03 RX ORDER — SODIUM CHLORIDE 0.9 % (FLUSH) 0.9 %
SYRINGE (ML) INJECTION
Status: DISCONTINUED | OUTPATIENT
Start: 2025-04-03 | End: 2025-04-03

## 2025-04-03 RX ORDER — CETIRIZINE HYDROCHLORIDE 10 MG/1
10 TABLET ORAL NIGHTLY
COMMUNITY

## 2025-04-03 RX ORDER — TALC
6 POWDER (GRAM) TOPICAL NIGHTLY PRN
Status: DISCONTINUED | OUTPATIENT
Start: 2025-04-03 | End: 2025-04-06 | Stop reason: HOSPADM

## 2025-04-03 RX ORDER — MIRTAZAPINE 15 MG/1
15 TABLET, FILM COATED ORAL NIGHTLY
COMMUNITY
Start: 2025-03-28

## 2025-04-03 RX ORDER — ACETAMINOPHEN 10 MG/ML
1000 INJECTION, SOLUTION INTRAVENOUS ONCE
Status: COMPLETED | OUTPATIENT
Start: 2025-04-03 | End: 2025-04-03

## 2025-04-03 RX ADMIN — LIDOCAINE HYDROCHLORIDE 80 MG: 20 INJECTION, SOLUTION EPIDURAL; INFILTRATION; INTRACAUDAL; PERINEURAL at 08:04

## 2025-04-03 RX ADMIN — PROPOFOL 20 MG: 10 INJECTION, EMULSION INTRAVENOUS at 08:04

## 2025-04-03 RX ADMIN — ACETAMINOPHEN 1000 MG: 10 INJECTION, SOLUTION INTRAVENOUS at 10:04

## 2025-04-03 RX ADMIN — PANTOPRAZOLE SODIUM 8 MG/HR: 40 INJECTION, POWDER, FOR SOLUTION INTRAVENOUS at 10:04

## 2025-04-03 RX ADMIN — PANTOPRAZOLE SODIUM 8 MG/HR: 40 INJECTION, POWDER, FOR SOLUTION INTRAVENOUS at 06:04

## 2025-04-03 RX ADMIN — SODIUM CHLORIDE, PRESERVATIVE FREE 10 ML: 5 INJECTION INTRAVENOUS at 08:04

## 2025-04-03 RX ADMIN — CEFTRIAXONE SODIUM 1 G: 1 INJECTION, POWDER, FOR SOLUTION INTRAMUSCULAR; INTRAVENOUS at 05:04

## 2025-04-03 RX ADMIN — PANTOPRAZOLE SODIUM 80 MG: 40 INJECTION, POWDER, FOR SOLUTION INTRAVENOUS at 06:04

## 2025-04-03 RX ADMIN — IOPAMIDOL 100 ML: 755 INJECTION, SOLUTION INTRAVENOUS at 05:04

## 2025-04-03 RX ADMIN — SODIUM CHLORIDE: 9 INJECTION, SOLUTION INTRAVENOUS at 10:04

## 2025-04-03 RX ADMIN — PROPOFOL 40 MG: 10 INJECTION, EMULSION INTRAVENOUS at 08:04

## 2025-04-03 RX ADMIN — PROPOFOL 30 MG: 10 INJECTION, EMULSION INTRAVENOUS at 08:04

## 2025-04-03 RX ADMIN — PANTOPRAZOLE SODIUM 8 MG/HR: 40 INJECTION, POWDER, FOR SOLUTION INTRAVENOUS at 11:04

## 2025-04-03 RX ADMIN — SODIUM CHLORIDE 1000 ML: 4.5 INJECTION, SOLUTION INTRAVENOUS at 04:04

## 2025-04-03 RX ADMIN — SODIUM CHLORIDE: 9 INJECTION, SOLUTION INTRAVENOUS at 11:04

## 2025-04-03 NOTE — OP NOTE
Procedure date: 04/03/2025      Indications:  89-year-old white female admitted through the emergency department for suspicion of acute onset GI bleeding.  Patient has a sudden drop in her baseline H&H with the associated coffee-ground emesis and dark stools.  She will undergo urgent EGD for evaluation.      Preoperative diagnosis: 1. Acute anemia 2. Suspicion of GI blood loss   Postoperative diagnosis: 1. Acute anemia 2. Hemorrhagic gastritis    Procedure performed: Diagnostic EGD     Procedure in detail: Patient was brought to the endoscopy suite laid in a slight supine position.  Intravenous anesthesia provided.  Oral bite plate placed in the mouth.  An endoscope was then passed through the oropharynx intubating the esophagus with easy transit into the stomach.  Upon reaching the GE junction there was noted to be coffee-ground mucosa noted throughout this region refluxing into the esophagus.  The scope was then carefully maneuvered into the stomach.  A large amount of clotted and dark and blood was identified.  It was suctioned and irrigated the best of my ability for visualization.  The scope was then advanced into duodenum there was no active bleeding noted within the duodenum reaching the 2nd and 3rd portion.  It was withdrawn back into the stomach the gastric mucosa was thoroughly examined.  Along the lesser and greater curvature of the stomach multiple areas of petechial hemorrhage were noted throughout the gastric lining.  No overt ulcer were seen.  There was some clotted blood.  It was washed and irrigated for visualization however patient continuously refluxing blood.  I thought it would be best to decompress the stomach and retrieved the scope to prevent a significant aspiration event.  The regions which reviewed did show signs consistent with hemorrhagic gastritis.  The patient was also found to have a large sliding hiatal hernia type 1 also having some associated generalized mucosal bleeding.  No large  ulcer was seen.  The scope was removed carefully while suctioning refluxing blood throughout.  Scope was removed in neutral position.  The patient was relieved of anesthesia stable condition and transferred to postanesthesia care unit.      Complications: None   Estimated blood loss:  None   Specimens: None     Disposition: Upon recovering from anesthesia patient will be transferred to the floor for further medical management.    Recommendations: Would recommend NPO status with protein polyp inhibitor drip and bowel rest at this time.  After complete resuscitation H&H can be monitored and if further endoscopies required we will do so.  The source of bleeding at this time as from upper GI source.      Mae Alcazar MD

## 2025-04-03 NOTE — ANESTHESIA PREPROCEDURE EVALUATION
04/03/2025  Roxi Short is a 89 y.o., female.      Pre-op Assessment    I have reviewed the Patient Summary Reports.     I have reviewed the Nursing Notes. I have reviewed the NPO Status.   I have reviewed the Medications.     Review of Systems  Anesthesia Hx:             Denies Family Hx of Anesthesia complications.    Denies Personal Hx of Anesthesia complications.                    Social:  Non-Smoker, No Alcohol Use       Hematology/Oncology:  Hematology Normal   Oncology Normal                                   EENT/Dental:  EENT/Dental Normal           Cardiovascular:     Hypertension, well controlled                                          Pulmonary:  Pulmonary Normal                       Renal/:  Renal/ Normal                 Hepatic/GI:     GERD, well controlled   GI blood loss             Musculoskeletal:  Arthritis               Neurological:       Dementia                         Endocrine:  Endocrine Normal            Dermatological:  Skin Normal    Psych:  Psychiatric History                  Physical Exam  General: Cooperative, Alert and Oriented    Airway:  Mallampati: II   Mouth Opening: Normal  TM Distance: Normal  Tongue: Normal  Neck ROM: Normal ROM    Dental:  Intact        Anesthesia Plan  Type of Anesthesia, risks & benefits discussed:    Anesthesia Type: Gen Natural Airway  Intra-op Monitoring Plan: Standard ASA Monitors  Post Op Pain Control Plan:   (medical reason for not using multimodal pain management)  Induction:  IV  Informed Consent: Informed consent signed with the Patient and all parties understand the risks and agree with anesthesia plan.  All questions answered. Patient consented to blood products? Yes  ASA Score: 3    Ready For Surgery From Anesthesia Perspective.     .

## 2025-04-03 NOTE — H&P
Hospital Medicine History & Physical Examination       Patient Name: Roxi Short  MRN: [id number]  Patient Class: OP- Observation   Admission Date: 4/3/2025   Admitting Physician: [facility]   Length of Stay: 0  Attending Physician: Chente Nathan MD  Primary Care Provider: Nathan Gomes MD  Face-to-Face encounter date: 04/03/2025  Code Status:  Chief Complaint: Emesis (Pt brought in by [facility] from [facility] with c/o vomiting coffee ground emesis once or twice. EMS states [facility] told them she also had 2 BM tonight.)      Screening for Social Drivers for health:  Patient screened for food insecurity, housing instability, transportation needs, utility difficulties, and interpersonal safety (select all that apply as identified as concern)  []Housing or Food  []Transportation Needs  []Utility Difficulties  []Interpersonal safety  [x]None      Patient information was obtained from patient, patient's family, past medical records and ER records.  ED records were reviewed in detail and documented below    HISTORY OF PRESENT ILLNESS:   Roxi Short is a 89 y.o. female who  has a past medical history of Allergic rhinitis, Alzheimer's disease, unspecified (CODE), Anxiety disorder, unspecified, Bilateral primary osteoarthritis of knee, Cognitive communication deficit, Constipation, Dementia, Essential (primary) hypertension, GERD (gastroesophageal reflux disease), herpesviral infection, unspecified, Hyperlipidemia, Insomnia, Major depressive disorder, single episode, unspecified, Malaise and fatigue, Muscle weakness (generalized), Pseudobulbar affect, Psychosis, unspecified psychosis type, Unspecified abnormalities of gait and mobility, Unspecified retinal disorder, Urinary tract infection, site not specified, and Vitamin deficiency..     89 years old female history of Alzheimer disease was transferred from nursing home to our emergency room secondary to coffee-ground emesis and black stool for 1 day     No fever,  no shortness for breath, no chest pain     The patient denies any Advil or Motrin use     At emergency room, WBC 10, hemoglobin 9.2, BUN 31 creatinine 0.7     Abdominal x-ray shows gastric distention     The patient was admitted to the hospital for upper GI bleeding    General surgeon performed the emergency EGD and EGD shows hemorrhagic gastritis    PAST MEDICAL HISTORY:     Past Medical History:   Diagnosis Date    Allergic rhinitis     Alzheimer's disease, unspecified (CODE)     Anxiety disorder, unspecified     Bilateral primary osteoarthritis of knee     Cognitive communication deficit     Constipation     Dementia     Essential (primary) hypertension     GERD (gastroesophageal reflux disease)     herpesviral infection, unspecified     Hyperlipidemia     Insomnia     Major depressive disorder, single episode, unspecified     Malaise and fatigue     Muscle weakness (generalized)     Pseudobulbar affect     Psychosis, unspecified psychosis type     Unspecified abnormalities of gait and mobility     Unspecified retinal disorder     Urinary tract infection, site not specified     Vitamin deficiency        PAST SURGICAL HISTORY:   History reviewed. No pertinent surgical history.    ALLERGIES:   Levofloxacin, Codeine, and Sulfa (sulfonamide antibiotics)    FAMILY HISTORY:   Reviewed and negative    SOCIAL HISTORY:     Social History     Tobacco Use    Smoking status: Unknown    Smokeless tobacco: Not on file   Substance Use Topics    Alcohol use: Not on file        HOME MEDICATIONS:     Prior to Admission medications    Medication Sig Start Date End Date Taking? Authorizing Provider   acetaminophen (TYLENOL) 325 MG tablet Take 325 mg by mouth 4 (four) times daily.   Yes Provider, Historical   acyclovir (ZOVIRAX) 400 MG tablet Take 400 mg by mouth 2 (two) times daily. 3/27/25  Yes Provider, Historical   aspirin 81 MG Chew Take 81 mg by mouth once daily.   Yes Provider, Historical   atorvastatin (LIPITOR) 20 MG tablet  Take 20 mg by mouth every evening. 3/31/25  Yes Provider, Historical   brexpiprazole (REXULTI) 1 mg Tab Take 1 tablet by mouth Daily.   Yes Provider, Historical   carvediloL (COREG) 12.5 MG tablet Take 12.5 mg by mouth 2 (two) times daily. Hold if SBP <120 AND/OR Pulse <60 3/25/25  Yes Provider, Historical   cetirizine (ZYRTEC) 10 MG tablet Take 10 mg by mouth every evening.   Yes Provider, Historical   cloNIDine (CATAPRES) 0.1 MG tablet Take 0.1 mg by mouth every 8 (eight) hours as needed (SBP >160). 11/20/24  Yes Provider, Historical   cranberry fruit (CRANBERRY) 450 mg Tab Take 1 tablet by mouth nightly.   Yes Provider, Historical   dextromethorphan-quinidine 20-10 mg (NUEDEXTA) 20-10 mg per capsule Take 1 capsule by mouth 2 (two) times a day.   Yes Provider, Historical   famotidine (PEPCID) 20 MG tablet Take 20 mg by mouth Daily. 3/31/25  Yes Provider, Historical   memantine (NAMENDA) 10 MG Tab Take 10 mg by mouth 2 (two) times daily. 3/25/25  Yes Provider, Historical   mirtazapine (REMERON) 15 MG tablet Take 15 mg by mouth every evening. 3/28/25  Yes Provider, Historical   MULTIVITAMIN ORAL Take 1 tablet by mouth once daily.   Yes Provider, Historical   mv-mn/om3/dha/epa/fish/lut/lima (OCUVITE ADULT 50 PLUS ORAL) Take 1 capsule by mouth Daily.   Yes Provider, Historical   peg 400-propylene glycol, PF, 0.4-0.3 % Drop Apply 1 drop to eye 3 (three) times daily. Instill 1 drop in both eyes 3 times a day   Yes Provider, Historical   polyethylene glycol (GLYCOLAX) 17 gram PwPk Take 17 g by mouth once daily.   Yes Provider, Historical   protein hydrolysate,milk (LIQUID PROTEIN FORTIFIER ORAL) Take 30 mLs by mouth Daily.   Yes Provider, Historical   risperiDONE (RISPERDAL) 0.25 MG Tab Take 0.25 mg by mouth nightly. 4/1/25  Yes Provider, Historical   rivastigmine 13.3 mg/24 hour PT24 Apply 1 patch topically Daily. 3/24/25  Yes Provider, Historical   valsartan (DIOVAN) 40 MG tablet Take 40 mg by mouth once daily. 3/21/25   Yes Provider, Historical   venlafaxine (EFFEXOR-XR) 150 MG Cp24 Take 150 mg by mouth once daily.   Yes Provider, Historical   vitamin D (VITAMIN D3) 1000 units Tab Take 1 tablet by mouth once daily.   Yes Provider, Historical       REVIEW OF SYSTEMS:   Except as documented, all other systems reviewed and negative     PHYSICAL EXAM:     VITAL SIGNS: 24 HRS MIN & MAX LAST   Temp  Min: 98.4 °F (36.9 °C)  Max: 98.4 °F (36.9 °C) 98.4 °F (36.9 °C)   BP  Min: 92/49  Max: 189/74 (!) 159/72   Pulse  Min: 80  Max: 104  95   Resp  Min: 16  Max: 20 16   SpO2  Min: 82 %  Max: 97 % (!) 82 %     General appearance: Well-developed, well-nourished female in no apparent distress.  HENT: Atraumatic head. Moist mucous membranes of oral cavity.  Eyes: Normal extraocular movements.   Neck: Supple.   Lungs: Clear to auscultation bilaterally. No wheezing present.   Heart: Regular rate and rhythm. S1 and S2 present with no murmurs/gallop/rub. No pedal edema. No JVD present.   Abdomen: Soft, non-distended, non-tender. No rebound tenderness/guarding. Bowel sounds are normal.   Extremities: No cyanosis, clubbing, or edema.  Skin: No Rash.   Neuro: Motor and sensory exams grossly intact. Good tone. Muscle strength 5/5 in all 4 extremities  Psych/mental status: Appropriate mood and affect.  Demented    LABS AND IMAGING:     Recent Labs   Lab 04/03/25  0404   WBC 10.51   RBC 3.00*   HGB 9.2*   HCT 29.9*   MCV 99.7*   MCH 30.7   MCHC 30.8*   RDW 14.1      MPV 10.4       Recent Labs   Lab 04/03/25  0404   *   K 3.8   *   CO2 23   BUN 31.0*   CREATININE 0.77   CALCIUM 7.9*   ALBUMIN 2.8*   ALKPHOS 73   ALT 10   AST 13   BILITOT 0.4       Microbiology Results (last 7 days)       Procedure Component Value Units Date/Time    Blood culture #1 **CANNOT BE ORDERED STAT** [[id number]] Collected: 04/03/25 9272    Order Status: Resulted Specimen: Blood from Antecubital, Left Updated: 04/03/25 0540    Blood culture #2 **CANNOT BE ORDERED  STAT** [[id number]] Collected: 04/03/25 0536    Order Status: Resulted Specimen: Blood from Forearm, Left Updated: 04/03/25 0540             CT Abdomen Pelvis With IV Contrast NO Oral Contrast  Narrative: EXAMINATION:  CT ABDOMEN PELVIS WITH IV CONTRAST    CLINICAL HISTORY:  GI bleed;, .    TECHNIQUE:  PATIENT RADIATION DOSE: DLP(mGycm) : 869    As per PQRS measures, all CT scans at this facility used dose modulation, iterative reconstruction, and/or weight based dose adjustment when appropriate to reduce radiation dose to as low as reasonably achievable.    COMPARISON:  None available    FINDINGS:  Serial axial images were obtained through the abdomen and pelvis with the administration of  IV contrast. Coronal and sagittal reconstructions where also obtained. Degenerative changes and curvature are noted to the thoracolumbar spine.  Bony structures are osteopenic.  There is grade 1 anterolisthesis of L4 on L5.  There is compression deformity evident at T12 and L1.  There is slight retrolisthesis of L1 on L2.  Patchy hazy linear opacities evident at the lung bases.  There is nodule at the posterior left lung base measuring 1.3 cm.  The heart is mildly enlarged.  There is soft tissue/mucosal prominence at the distal esophagus/hiatus hernia.  There is beam hardening artifact at the upper abdomen due to positioning.  The liver, spleen, adrenal glands, and pancreas are grossly within normal limits.  The gallbladder is surgically absent.  There is mild prominence of common bile duct and tapering at the pancreatic head.  Extensive atherosclerosis is seen within the aorta and branching vessels.  The stomach is distended with gas, fluid, and particulate food matter.  The kidneys are relatively symmetric in size.  No hydronephrosis is seen.  There is mild bilateral renal lobulation.  A few ill-defined mildly prominent lymph nodes seen within the periaortic and pericaval region measuring up to 1 cm.  No dilated loops of bowel  are identified.  Gas and feces are seen within the colon.  The appendix is within normal limits.  Scattered diverticula are noted to the descending and sigmoid colon.  The uterus is normal in size.  The bladder is partially distended with fluid.  No significant free fluid collection identified.  Impression: 1. Patchy infiltrate and atelectasis at the lung bases with 1.3 cm pleural base nodularity at left lung base.  Follow-up per Fleischner criteria  2. Ill-defined soft tissue/mucosal prominence at the distal esophagus/GE junction/hiatus hernia  3. Mild cardiomegaly  4. Extensive atherosclerosis  5. Diverticulosis coli  6. Findings and other details as above    Electronically signed by: Brody Dorsey  Date:    04/03/2025  Time:    08:46  X-Ray Abdomen Flat And Erect  Narrative: EXAMINATION:  XR ABDOMEN FLAT AND ERECT    CLINICAL HISTORY:  XR ABDOMEN FLAT AND ERECT, .    COMPARISON:  None available    FINDINGS:  Upright and supine views reveal a nonspecific bowel gas pattern without evidence of obstruction. No free air is seen outside of bowel lumen. Gas and feces are seen within the colon.  Degenerative changes and curvature noted to the thoracolumbar spine.  There is gaseous distension of the stomach.  Surgical clips noted to the right lower chest and right upper abdomen.  Bony structures are osteopenic.  Impression: 1. Considerable gaseous distension of the stomach.  Clinical correlation is indicated  2. Thoracolumbar spondylosis, scoliosis, and osteopenia  3. Postsurgical changes at the right lower chest and right upper abdomen    Electronically signed by: Brody Dorsey  Date:    04/03/2025  Time:    08:38      ASSESSMENT & PLAN:     Acute blood loss anemia secondary to upper GI bleeding   Upper GI bleeding secondary to hemorrhagic gastritis   Hemorrhage gastritis   Alzheimer disease   Hypertension   Hypercholesterolemia    Continue NPO  Continue hydration with normal saline 100 cc/hour   Continue IV Protonix  drip   Consult general surgeon   Check CBC BMP in a.m.      VTE Prophylaxis: will be placed on Heparin/Lovenox/ Xarelto/ SCD for DVT prophylaxis and will be advised to be as mobile as possible and sit in a chair as tolerated    Patient condition:  Stable/Fair/Guarded/ Serious/ Critical    __________________________________________________________________________  INPATIENT LIST OF MEDICATIONS     Scheduled Meds:  Continuous Infusions:   lactated ringers   Intravenous Continuous         PRN Meds:.  Current Facility-Administered Medications:     melatonin, 6 mg, Oral, Nightly PRN    sodium chloride 0.9%, 10 mL, Intravenous, PRN      I, _ NP/PA have reviewed and discussed the case with  _   Please see the following addendum for further assessment and plan from there attending MD.    04/03/2025    ________________________________________________________________________________    MD Addendum:  Dr. CITLALLI ---assumed care of this patient today at ---am/pm  For the patient encounter, I performed the substantive portion of the visit, I reviewed the NP/PA documentation, treatment plan, and medical decision making.  I had face to face time with this patient     A. History:    B. Physical exam:    C. Medical decision making:    Discharge Planning and Disposition: No mobility needs. Ambulating well. Good social support system.   Anticipated discharge    If patient was admitted under observational status it is with my approval/permission.        All diagnosis and differential diagnosis have been reviewed; assessment and plan has been documented; I have personally reviewed the labs and test results that are presently available; I have reviewed the patients medication list; I have reviewed the consulting providers response and recommendations. I have reviewed or attempted to review medical records based upon their availability.    All of the patient and family questions have been addressed and answered. Patient's is agreeable to  the above stated plan. I will continue to monitor closely and make adjustments to medical management as needed.    If patient was admitted under observational status it is with my approval/permission.      Chente Nathan MD   04/03/2025

## 2025-04-03 NOTE — ANESTHESIA POSTPROCEDURE EVALUATION
Anesthesia Post Evaluation    Patient: Roxi Short    Procedure(s) Performed: Procedure(s) (LRB):  EGD (ESOPHAGOGASTRODUODENOSCOPY) (N/A)    Final Anesthesia Type: general      Patient participation: No - Unable to Participate, Coma/Other Inability to Communicate  Level of consciousness: awake  Post-procedure vital signs: reviewed and stable  Pain management: adequate  Airway patency: patent    PONV status at discharge: No PONV  Anesthetic complications: no      Cardiovascular status: stable  Respiratory status: spontaneous ventilation and nasal cannula  Hydration status: euvolemic  Follow-up not needed.              Vitals Value Taken Time     04/03/25 08:37     04/03/25 08:37     04/03/25 08:37     04/03/25 08:37     04/03/25 08:37         No case tracking events are documented in the log.      Pain/Petr Score: No data recorded

## 2025-04-03 NOTE — ED PROVIDER NOTES
Encounter Date: 4/3/2025       History     Chief Complaint   Patient presents with    Emesis     Pt brought in by [facility] from [facility] with c/o vomiting coffee ground emesis once or twice. EMS states [facility] told them she also had 2 BM tonight.     81-year-old white female with dementia sent in from [facility] home with 2 bowel movements that were dark and an episode of emesis that appeared to have coffee grinds         Review of patient's allergies indicates:   Allergen Reactions    Levofloxacin Other (See Comments)    Codeine Itching    Sulfa (sulfonamide antibiotics) Rash     Past Medical History:   Diagnosis Date    Allergic rhinitis     Alzheimer's disease, unspecified (CODE)     Anxiety disorder, unspecified     Bilateral primary osteoarthritis of knee     Cognitive communication deficit     Constipation     Dementia     Essential (primary) hypertension     GERD (gastroesophageal reflux disease)     herpesviral infection, unspecified     Hyperlipidemia     Insomnia     Major depressive disorder, single episode, unspecified     Malaise and fatigue     Muscle weakness (generalized)     Pseudobulbar affect     Psychosis, unspecified psychosis type     Unspecified abnormalities of gait and mobility     Unspecified retinal disorder     Urinary tract infection, site not specified     Vitamin deficiency      History reviewed. No pertinent surgical history.  No family history on file.  Social History[1]  Review of Systems   Unable to perform ROS: Dementia       Physical Exam     Initial Vitals [04/03/25 0345]   BP Pulse Resp Temp SpO2   (!) 146/84 84 18 98.4 °F (36.9 °C) 95 %      MAP       --         Physical Exam    Nursing note and vitals reviewed.  Constitutional: She appears well-developed and well-nourished.   HENT:   Head: Normocephalic and atraumatic.   Eyes: EOM are normal.   Neck: Neck supple.   Cardiovascular:  Normal rate.           Pulmonary/Chest: Breath sounds normal.   Abdominal: Abdomen is soft.  She exhibits distension. Bowel sounds are increased.   Musculoskeletal:         General: Normal range of motion.      Cervical back: Neck supple.     Neurological: She has normal strength.   Sleeping secondary to the nursing home giving her melatonin however upon moving her from gurney to bed patient screams out but will not answer questions   Skin: Skin is warm and dry. Capillary refill takes 2 to 3 seconds.         ED Course   Procedures  Labs Reviewed   COMPREHENSIVE METABOLIC PANEL - Abnormal       Result Value    Sodium 146 (*)     Potassium 3.8      Chloride 117 (*)     CO2 23      Glucose 135 (*)     Blood Urea Nitrogen 31.0 (*)     Creatinine 0.77      Calcium 7.9 (*)     Protein Total 5.5 (*)     Albumin 2.8 (*)     Globulin 2.7      Albumin/Globulin Ratio 1.0 (*)     Bilirubin Total 0.4      ALP 73      ALT 10      AST 13      eGFR >60      Anion Gap 6.0      BUN/Creatinine Ratio 40     URINALYSIS, REFLEX TO URINE CULTURE - Abnormal    Color, UA Yellow      Appearance, UA Clear      Specific Gravity, UA 1.020      pH, UA 7.0      Protein, UA 1+ (*)     Glucose, UA Negative      Ketones, UA Trace (*)     Blood, UA 2+ (*)     Bilirubin, UA Negative      Urobilinogen, UA 1.0      Nitrites, UA Negative      Leukocyte Esterase, UA Trace (*)    PROTIME-INR - Abnormal    PT 17.7 (*)     INR 1.4 (*)     Narrative:     Protimes are used to monitor anticoagulant agents such as warfarin. PT INR values are based on the current patient normal mean and the IRVIN value for the specific instrument reagent used.  **Routine theraputic target values for the INR are 2.0-3.0**   CBC WITH DIFFERENTIAL - Abnormal    WBC 10.51      RBC 3.00 (*)     Hgb 9.2 (*)     Hct 29.9 (*)     MCV 99.7 (*)     MCH 30.7      MCHC 30.8 (*)     RDW 14.1      Platelet 180      MPV 10.4      Neut % 53.3      Lymph % 37.3      Mono % 7.4      Eos % 1.6      Basophil % 0.1      Imm Grans % 0.3      Neut # 5.60      Lymph # 3.92      Mono # 0.78       Eos # 0.17      Baso # 0.01      Imm Gran # 0.03     OCCULT BLOOD STOOL, CA SCREEN - Abnormal    Occult Blood Stool 1 Positive (*)    URINALYSIS, MICROSCOPIC - Abnormal    Bacteria, UA Few (*)     RBC, UA 3-5      WBC, UA 3-5      Squamous Epithelial Cells, UA Few (*)    LIPASE - Normal    Lipase Level 29     BLOOD CULTURE [facility]   BLOOD CULTURE [facility]   CBC W/ AUTO DIFFERENTIAL    Narrative:     The following orders were created for panel order CBC auto differential.  Procedure                               Abnormality         Status                     ---------                               -----------         ------                     CBC with Differential[[id number]]       Abnormal            Final result                 Please view results for these tests on the individual orders.   OCCULT BLOOD,STOOL,SCREEN (1-3)    Narrative:     The following orders were created for panel order Occult Blood, Stool Screening (1 -3).  Procedure                               Abnormality         Status                     ---------                               -----------         ------                     Occult Blood Stool, CA ...[[id number]]  Abnormal            Final result               OCCULT BLOOD STOOL, CA ...[[id number]]                                                   Please view results for these tests on the individual orders.   OCCULT BLOOD STOOL, CA SCREEN 2ND SPEC          Imaging Results              CT Abdomen Pelvis With IV Contrast NO Oral Contrast (Preliminary result)  Result time 04/03/25 06:17:38      Preliminary result by Zach Osborne Jr., MD (04/03/25 06:17:38)                   Narrative:    START OF REPORT:  Technique: CT of the abdomen and pelvis was performed with axial images as well as sagittal and coronal reconstruction images with intravenous contrast.    Comparison: None available.    Clinical History: Abd pain.    Dosage Information: Automated Exposure Control was utilized 869.04  mGy.cm.    Findings:  Lines and Tubes: None.  Thorax:  Lungs: There is moderate nonspecific dependent change at the lung bases. No focal infiltrate or consolidation is seen.  Pleura: No effusions or thickening are seen.  Heart: The heart size is within normal limits. Moderate coronary artery calcification is seen.  Liver: There is mild intrahepatic biliary duct dilatation in this patient status post cholecystectomy. The liver otherwise appears unremarkable.  Biliary System: The extra hepatic biliary system appears prominent which may reflect prior obstructive physiology in this patient status post cholecystectomy.  Gallbladder: Surgical clips are seen in the gallbladder fossa consistent with prior cholecystectomy.  Pancreas: The pancreas appears unremarkable.  Spleen: The spleen appears unremarkable.  Adrenals: The adrenal glands appear unremarkable.  Kidneys: The left kidney appears unremarkable with no stones cysts masses or hydronephrosis. A single cyst measuring is seen on Image 67, Series 3 in the lower pole of the right kidney. The right kidney otherwise appears unremarkable with no stones masses or hydronephrosis identified.  Aorta: There is pronounced calcification of the abdominal aorta and its branches.  IVC: Unremarkable.  Bowel:  Esophagus: There is a small hiatal hernia.  Stomach: The stomach appears unremarkable.  Duodenum: Unremarkable appearing duodenum.  Small Bowel: The small bowel appears unremarkable.  Colon: Multiple diverticula are seen predominantly in the sigmoid colon. No associated inflammatory stranding or pericolonic fluid is seen to suggest diverticulitis.  Appendix: The appendix appears unremarkable seen on Image 94, Series 2 through Image 98, Series 2.  Peritoneum: No intraperitoneal free air or ascites is seen.    Pelvis:  Bladder: The bladder appears unremarkable.  Female:  Uterus: The uterus appears unremarkable for age.  Ovaries: No adnexal masses are seen.    Bony  structures:  Dorsal Spine: There is pronounced multilevel spondylosis of the visualized dorsal spine.  Bony Pelvis: There is subtle degenerative change of the bilateral hips.      Impression:  1. Multiple diverticula are seen predominantly in the sigmoid colon. No associated inflammatory stranding or pericolonic fluid is seen to suggest diverticulitis.  2. No acute intraabdominal or pelvic solid organ or bowel pathology identified. Details and other findings as discussed above.                                         X-Ray Abdomen Flat And Erect (Preliminary result)  Result time 04/03/25 05:20:49      Wet Read by Magdaleno Ocasio MD (04/03/25 05:20:49, [facility] - Emergency Dept, Emergency Medicine)    Nonspecific bowel gas pattern with a gas distended gastric area but no evidence of perforation or obstruction noted                                     Medications   sodium chloride 0.45 % bolus 1,000 mL (1,000 mLs Intravenous New Bag 4/3/25 1933)   cefTRIAXone injection 1 g (1 g Intravenous Given 4/3/25 0538)   iopamidoL (ISOVUE-370) injection 100 mL (100 mLs Intravenous Given 4/3/25 0582)   pantoprazole injection 80 mg (80 mg Intravenous Given 4/3/25 0618)     Medical Decision Making  89-year-old white female with a history of dementia presents with dark school and coffee-ground emesis.  Differential diagnosis includes but is not limited to gastrointestinal bleed, peptic ulcer disease, diverticulosis with bleed, hemorrhoidal bleed, arteriovenous malformation, colitis.  Workup includes blood work urinalysis and an x-ray of the abdomen.  She has significantly hypertensive and her exam is difficult as patient is sleeping secondary to getting sleeping meds but arouses upon stimulation and yells and lashes out.  She does not answer questions which appears to be her baseline secondary to dementia according to the daughter.  Upon getting her CBC back her hemoglobin hematocrit were profoundly low compared  to her most recent labs of 6 months ago and trending out to 2 years she has never had an H&H that showed any amount of anemia prior to tonight.  Hemoccult was done and is positive.  She was finally able to give urine and it was sent up showing some bacteria so blood cultures were also obtained and she has been treated for urinary tract infection and I have ordered Rocephin which will also cover some potential gastrointestinal issues if there is a break done in the mucosa that has bleeding.  Because of this I have ordered a CT scan of the abdomen and pelvis with IV contrast in the daughters in agreement and doing this.  I did discuss potential EGD and colonoscopy with daughter and she says yes she is amenable to signing consents to have that done but no major surgeries.    Patient signed out to me, Dr. Estrada, at shift change pending CT results. CT shows no acute abnormalities. She is given 80mg Protonix IV for possible upper gi bleed. General Surgery, Dr. Alcazar consulted and recommend keeping her NPO with plan for endoscopy later this morning. Medicine consulted for admission. I have spoken with the patient and/or caregivers. I have explained the patient's condition, diagnoses and treatment plan based on the information available to me at this time. I have answered the patient's and/or caregiver's questions and addressed any concerns. The patient and/or caregivers have as good an understanding of the patient's diagnosis, condition and treatment plan as can be expected at this point. The patient has been stabilized within the capability of the emergency department. The patient will be transported for further care and management or will be moved to an observation or inpatient service. I have communicated with the staff or medical practitioner taking over this patient's care.       Problems Addressed:  Acute blood loss anemia: acute illness or injury with systemic symptoms that poses a threat to life or bodily  functions  Dementia, unspecified dementia severity, unspecified dementia type, unspecified whether behavioral, psychotic, or mood disturbance or anxiety: chronic illness or injury  Gastrointestinal hemorrhage, unspecified gastrointestinal hemorrhage type: acute illness or injury with systemic symptoms that poses a threat to life or bodily functions  Urinary tract infection with hematuria, site unspecified: acute illness or injury with systemic symptoms that poses a threat to life or bodily functions    Amount and/or Complexity of Data Reviewed  Independent Historian: caregiver     Details: Daughter and nursing home records  External Data Reviewed: labs and notes.  Labs: ordered. Decision-making details documented in ED Course.  Radiology: ordered and independent interpretation performed. Decision-making details documented in ED Course.    Risk  Prescription drug management.  Decision regarding hospitalization.               ED Course as of 04/03/25 0641   Thu Apr 03, 2025   0545 Hemoglobin(!): 9.2 [PL]   0545 Hematocrit(!): 29.9 [PL]   0545 Occult Blood(!): Positive [PL]      ED Course User Index  [PL] Magdaleno Ocasio MD                           Clinical Impression:  Final diagnoses:  [D62] Acute blood loss anemia  [K92.2] Gastrointestinal hemorrhage, unspecified gastrointestinal hemorrhage type (Primary)  [N39.0, R31.9] Urinary tract infection with hematuria, site unspecified  [F03.90] Dementia, unspecified dementia severity, unspecified dementia type, unspecified whether behavioral, psychotic, or mood disturbance or anxiety          ED Disposition Condition    Observation Stable                    [1]   Social History  Tobacco Use    Smoking status: Unknown        Raheel Estrada DO  04/03/25 0662

## 2025-04-04 LAB
ANION GAP SERPL CALC-SCNC: 6 MEQ/L
BASOPHILS # BLD AUTO: 0.04 X10(3)/MCL
BASOPHILS NFR BLD AUTO: 0.3 %
BUN SERPL-MCNC: 23 MG/DL (ref 9.8–20.1)
CALCIUM SERPL-MCNC: 7.8 MG/DL (ref 8.4–10.2)
CHLORIDE SERPL-SCNC: 111 MMOL/L (ref 98–107)
CO2 SERPL-SCNC: 24 MMOL/L (ref 23–31)
CREAT SERPL-MCNC: 0.97 MG/DL (ref 0.55–1.02)
CREAT/UREA NIT SERPL: 24
EOSINOPHIL # BLD AUTO: 0.23 X10(3)/MCL (ref 0–0.9)
EOSINOPHIL NFR BLD AUTO: 1.5 %
ERYTHROCYTE [DISTWIDTH] IN BLOOD BY AUTOMATED COUNT: 14.3 % (ref 11.5–17)
GFR SERPLBLD CREATININE-BSD FMLA CKD-EPI: 56 ML/MIN/1.73/M2
GLUCOSE SERPL-MCNC: 97 MG/DL (ref 82–115)
HCT VFR BLD AUTO: 31.4 % (ref 37–47)
HGB BLD-MCNC: 9.9 G/DL (ref 12–16)
IMM GRANULOCYTES # BLD AUTO: 0.07 X10(3)/MCL (ref 0–0.04)
IMM GRANULOCYTES NFR BLD AUTO: 0.5 %
LACTATE SERPL-SCNC: 2.6 MMOL/L (ref 0.5–2.2)
LYMPHOCYTES # BLD AUTO: 5.5 X10(3)/MCL (ref 0.6–4.6)
LYMPHOCYTES NFR BLD AUTO: 36 %
MCH RBC QN AUTO: 30.6 PG (ref 27–31)
MCHC RBC AUTO-ENTMCNC: 31.5 G/DL (ref 33–36)
MCV RBC AUTO: 96.9 FL (ref 80–94)
MONOCYTES # BLD AUTO: 0.99 X10(3)/MCL (ref 0.1–1.3)
MONOCYTES NFR BLD AUTO: 6.5 %
NEUTROPHILS # BLD AUTO: 8.46 X10(3)/MCL (ref 2.1–9.2)
NEUTROPHILS NFR BLD AUTO: 55.2 %
NRBC BLD AUTO-RTO: 0 %
PLATELET # BLD AUTO: 177 X10(3)/MCL (ref 130–400)
PMV BLD AUTO: 10.5 FL (ref 7.4–10.4)
POTASSIUM SERPL-SCNC: 3.7 MMOL/L (ref 3.5–5.1)
RBC # BLD AUTO: 3.24 X10(6)/MCL (ref 4.2–5.4)
SODIUM SERPL-SCNC: 141 MMOL/L (ref 136–145)
WBC # BLD AUTO: 15.29 X10(3)/MCL (ref 4.5–11.5)

## 2025-04-04 PROCEDURE — 27000221 HC OXYGEN, UP TO 24 HOURS

## 2025-04-04 PROCEDURE — 25000003 PHARM REV CODE 250: Performed by: INTERNAL MEDICINE

## 2025-04-04 PROCEDURE — 11000001 HC ACUTE MED/SURG PRIVATE ROOM

## 2025-04-04 PROCEDURE — 36415 COLL VENOUS BLD VENIPUNCTURE: CPT | Performed by: INTERNAL MEDICINE

## 2025-04-04 PROCEDURE — 85025 COMPLETE CBC W/AUTO DIFF WBC: CPT | Performed by: INTERNAL MEDICINE

## 2025-04-04 PROCEDURE — 83605 ASSAY OF LACTIC ACID: CPT | Performed by: INTERNAL MEDICINE

## 2025-04-04 PROCEDURE — 80048 BASIC METABOLIC PNL TOTAL CA: CPT | Performed by: INTERNAL MEDICINE

## 2025-04-04 PROCEDURE — 94761 N-INVAS EAR/PLS OXIMETRY MLT: CPT

## 2025-04-04 PROCEDURE — 63600175 PHARM REV CODE 636 W HCPCS: Performed by: INTERNAL MEDICINE

## 2025-04-04 RX ORDER — ZIPRASIDONE MESYLATE 20 MG/ML
10 INJECTION, POWDER, LYOPHILIZED, FOR SOLUTION INTRAMUSCULAR EVERY 6 HOURS PRN
Status: DISCONTINUED | OUTPATIENT
Start: 2025-04-04 | End: 2025-04-05

## 2025-04-04 RX ORDER — MUPIROCIN 20 MG/G
OINTMENT TOPICAL 2 TIMES DAILY
Status: DISCONTINUED | OUTPATIENT
Start: 2025-04-04 | End: 2025-04-06 | Stop reason: HOSPADM

## 2025-04-04 RX ADMIN — PANTOPRAZOLE SODIUM 8 MG/HR: 40 INJECTION, POWDER, FOR SOLUTION INTRAVENOUS at 04:04

## 2025-04-04 RX ADMIN — ZIPRASIDONE MESYLATE 10 MG: 20 INJECTION, POWDER, LYOPHILIZED, FOR SOLUTION INTRAMUSCULAR at 04:04

## 2025-04-04 RX ADMIN — SODIUM CHLORIDE: 9 INJECTION, SOLUTION INTRAVENOUS at 10:04

## 2025-04-04 RX ADMIN — MUPIROCIN 1 G: 20 OINTMENT TOPICAL at 10:04

## 2025-04-04 RX ADMIN — PANTOPRAZOLE SODIUM 8 MG/HR: 40 INJECTION, POWDER, FOR SOLUTION INTRAVENOUS at 09:04

## 2025-04-04 RX ADMIN — PANTOPRAZOLE SODIUM 8 MG/HR: 40 INJECTION, POWDER, FOR SOLUTION INTRAVENOUS at 03:04

## 2025-04-04 NOTE — PROGRESS NOTES
Hospital Medicine progress note      Patient Name: Roxi Short  MRN: [id number]  Patient Class: OP- Observation   Admission Date: 4/3/2025   Admitting Physician: [facility] Service   Length of Stay: 0  Attending Physician: Chente Nathan MD  Primary Care Provider: Nathan Gomes MD  Face-to-Face encounter date: 04/04/2025  Code Status:  Chief Complaint: Emesis (Pt brought in by [facility] from [facility] with c/o vomiting coffee ground emesis once or twice. EMS states [facility] told them she also had 2 BM tonight.)      Screening for Social Drivers for health:  Patient screened for food insecurity, housing instability, transportation needs, utility difficulties, and interpersonal safety (select all that apply as identified as concern)  []Housing or Food  []Transportation Needs  []Utility Difficulties  []Interpersonal safety  [x]None      Patient information was obtained from patient, patient's family, past medical records and ER records.  ED records were reviewed in detail and documented below    HISTORY OF PRESENT ILLNESS:   Roxi Short is a 89 y.o. female who  has a past medical history of Allergic rhinitis, Alzheimer's disease, unspecified (CODE), Anxiety disorder, unspecified, Bilateral primary osteoarthritis of knee, Cognitive communication deficit, Constipation, Dementia, Essential (primary) hypertension, GERD (gastroesophageal reflux disease), herpesviral infection, unspecified, Hyperlipidemia, Insomnia, Major depressive disorder, single episode, unspecified, Malaise and fatigue, Muscle weakness (generalized), Pseudobulbar affect, Psychosis, unspecified psychosis type, Unspecified abnormalities of gait and mobility, Unspecified retinal disorder, Urinary tract infection, site not specified, and Vitamin deficiency..     89 years old female history of Alzheimer disease was transferred from nursing home to our emergency room secondary to coffee-ground emesis and black stool for 1 day     No fever, no shortness for  breath, no chest pain     The patient denies any Advil or Motrin use     At emergency room, WBC 10, hemoglobin 9.2, BUN 31 creatinine 0.7     Abdominal x-ray shows gastric distention     The patient was admitted to the hospital for upper GI bleeding    General surgeon performed the emergency EGD and EGD shows hemorrhagic gastritis    April 4, 2025-no black or bloody stool overnight, hemoglobin stable 9.9    PAST MEDICAL HISTORY:     Past Medical History:   Diagnosis Date    Allergic rhinitis     Alzheimer's disease, unspecified (CODE)     Anxiety disorder, unspecified     Bilateral primary osteoarthritis of knee     Cognitive communication deficit     Constipation     Dementia     Essential (primary) hypertension     GERD (gastroesophageal reflux disease)     herpesviral infection, unspecified     Hyperlipidemia     Insomnia     Major depressive disorder, single episode, unspecified     Malaise and fatigue     Muscle weakness (generalized)     Pseudobulbar affect     Psychosis, unspecified psychosis type     Unspecified abnormalities of gait and mobility     Unspecified retinal disorder     Urinary tract infection, site not specified     Vitamin deficiency        PAST SURGICAL HISTORY:     Past Surgical History:   Procedure Laterality Date    ESOPHAGOGASTRODUODENOSCOPY N/A 4/3/2025    Procedure: EGD (ESOPHAGOGASTRODUODENOSCOPY);  Surgeon: Mae Alcazar MD;  Location: [facility];  Service: General;  Laterality: N/A;  POST OP:HEMORRHAGIC GASTRIS, UPPER GI BLEED, HEMORRHAGIC GASTRITIS, LARGE I HIATAL HERNIA       ALLERGIES:   Levofloxacin, Codeine, and Sulfa (sulfonamide antibiotics)    FAMILY HISTORY:   Reviewed and negative    SOCIAL HISTORY:     Social History     Tobacco Use    Smoking status: Unknown    Smokeless tobacco: Not on file   Substance Use Topics    Alcohol use: Not on file        HOME MEDICATIONS:     Prior to Admission medications    Medication Sig Start Date End Date Taking? Authorizing Provider    acetaminophen (TYLENOL) 325 MG tablet Take 325 mg by mouth 4 (four) times daily.   Yes Provider, Historical   acyclovir (ZOVIRAX) 400 MG tablet Take 400 mg by mouth 2 (two) times daily. 3/27/25  Yes Provider, Historical   aspirin 81 MG Chew Take 81 mg by mouth once daily.   Yes Provider, Historical   atorvastatin (LIPITOR) 20 MG tablet Take 20 mg by mouth every evening. 3/31/25  Yes Provider, Historical   brexpiprazole (REXULTI) 1 mg Tab Take 1 tablet by mouth Daily.   Yes Provider, Historical   carvediloL (COREG) 12.5 MG tablet Take 12.5 mg by mouth 2 (two) times daily. Hold if SBP <120 AND/OR Pulse <60 3/25/25  Yes Provider, Historical   cetirizine (ZYRTEC) 10 MG tablet Take 10 mg by mouth every evening.   Yes Provider, Historical   cloNIDine (CATAPRES) 0.1 MG tablet Take 0.1 mg by mouth every 8 (eight) hours as needed (SBP >160). 11/20/24  Yes Provider, Historical   cranberry fruit (CRANBERRY) 450 mg Tab Take 1 tablet by mouth nightly.   Yes Provider, Historical   dextromethorphan-quinidine 20-10 mg (NUEDEXTA) 20-10 mg per capsule Take 1 capsule by mouth 2 (two) times a day.   Yes Provider, Historical   famotidine (PEPCID) 20 MG tablet Take 20 mg by mouth Daily. 3/31/25  Yes Provider, Historical   memantine (NAMENDA) 10 MG Tab Take 10 mg by mouth 2 (two) times daily. 3/25/25  Yes Provider, Historical   mirtazapine (REMERON) 15 MG tablet Take 15 mg by mouth every evening. 3/28/25  Yes Provider, Historical   MULTIVITAMIN ORAL Take 1 tablet by mouth once daily.   Yes Provider, Historical   mv-mn/om3/dha/epa/fish/lut/lima (OCUVITE ADULT 50 PLUS ORAL) Take 1 capsule by mouth Daily.   Yes Provider, Historical   peg 400-propylene glycol, PF, 0.4-0.3 % Drop Apply 1 drop to eye 3 (three) times daily. Instill 1 drop in both eyes 3 times a day   Yes Provider, Historical   polyethylene glycol (GLYCOLAX) 17 gram PwPk Take 17 g by mouth once daily.   Yes Provider, Historical   protein hydrolysate,milk (LIQUID PROTEIN  FORTIFIER ORAL) Take 30 mLs by mouth Daily.   Yes Provider, Historical   risperiDONE (RISPERDAL) 0.25 MG Tab Take 0.25 mg by mouth nightly. 4/1/25  Yes Provider, Historical   rivastigmine 13.3 mg/24 hour PT24 Apply 1 patch topically Daily. 3/24/25  Yes Provider, Historical   valsartan (DIOVAN) 40 MG tablet Take 40 mg by mouth once daily. 3/21/25  Yes Provider, Historical   venlafaxine (EFFEXOR-XR) 150 MG Cp24 Take 150 mg by mouth once daily.   Yes Provider, Historical   vitamin D (VITAMIN D3) 1000 units Tab Take 1 tablet by mouth once daily.   Yes Provider, Historical       REVIEW OF SYSTEMS:   Except as documented, all other systems reviewed and negative     PHYSICAL EXAM:     VITAL SIGNS: 24 HRS MIN & MAX LAST   Temp  Min: 97.3 °F (36.3 °C)  Max: 102.3 °F (39.1 °C) 99 °F (37.2 °C)   BP  Min: 101/39  Max: 200/153 127/72   Pulse  Min: 63  Max: 207  63   Resp  Min: 20  Max: 20 20   SpO2  Min: 80 %  Max: 100 % 96 %     General appearance: Well-developed, well-nourished female in no apparent distress.  HENT: Atraumatic head. Moist mucous membranes of oral cavity.  Eyes: Normal extraocular movements.   Neck: Supple.   Lungs: Clear to auscultation bilaterally. No wheezing present.   Heart: Regular rate and rhythm. S1 and S2 present with no murmurs/gallop/rub. No pedal edema. No JVD present.   Abdomen: Soft, non-distended, non-tender. No rebound tenderness/guarding. Bowel sounds are normal.   Extremities: No cyanosis, clubbing, or edema.  Skin: No Rash.   Neuro: Motor and sensory exams grossly intact. Good tone. Muscle strength 5/5 in all 4 extremities  Psych/mental status: Appropriate mood and affect.  Demented    LABS AND IMAGING:     Recent Labs   Lab 04/03/25  0404 04/04/25  0927   WBC 10.51 15.29*   RBC 3.00* 3.24*   HGB 9.2* 9.9*   HCT 29.9* 31.4*   MCV 99.7* 96.9*   MCH 30.7 30.6   MCHC 30.8* 31.5*   RDW 14.1 14.3    177   MPV 10.4 10.5*       Recent Labs   Lab 04/03/25  0404 04/04/25  0927   * 141   K  3.8 3.7   * 111*   CO2 23 24   BUN 31.0* 23.0*   CREATININE 0.77 0.97   CALCIUM 7.9* 7.8*   ALBUMIN 2.8*  --    ALKPHOS 73  --    ALT 10  --    AST 13  --    BILITOT 0.4  --        Microbiology Results (last 7 days)       Procedure Component Value Units Date/Time    Clostridium Diff Toxin, A & B, EIA [[id number]]     Order Status: Sent Specimen: Stool     Blood culture #1 **CANNOT BE ORDERED STAT** [[id number]]  (Normal) Collected: 04/03/25 0523    Order Status: Completed Specimen: Blood from Antecubital, Left Updated: 04/04/25 0800     Blood Culture No Growth At 24 Hours    Blood culture #2 **CANNOT BE ORDERED STAT** [[id number]]  (Normal) Collected: 04/03/25 0536    Order Status: Completed Specimen: Blood from Forearm, Left Updated: 04/04/25 0800     Blood Culture No Growth At 24 Hours             CT Abdomen Pelvis With IV Contrast NO Oral Contrast  Narrative: EXAMINATION:  CT ABDOMEN PELVIS WITH IV CONTRAST    CLINICAL HISTORY:  GI bleed;, .    TECHNIQUE:  PATIENT RADIATION DOSE: DLP(mGycm) : 869    As per PQRS measures, all CT scans at this facility used dose modulation, iterative reconstruction, and/or weight based dose adjustment when appropriate to reduce radiation dose to as low as reasonably achievable.    COMPARISON:  None available    FINDINGS:  Serial axial images were obtained through the abdomen and pelvis with the administration of  IV contrast. Coronal and sagittal reconstructions where also obtained. Degenerative changes and curvature are noted to the thoracolumbar spine.  Bony structures are osteopenic.  There is grade 1 anterolisthesis of L4 on L5.  There is compression deformity evident at T12 and L1.  There is slight retrolisthesis of L1 on L2.  Patchy hazy linear opacities evident at the lung bases.  There is nodule at the posterior left lung base measuring 1.3 cm.  The heart is mildly enlarged.  There is soft tissue/mucosal prominence at the distal esophagus/hiatus hernia.  There is beam  hardening artifact at the upper abdomen due to positioning.  The liver, spleen, adrenal glands, and pancreas are grossly within normal limits.  The gallbladder is surgically absent.  There is mild prominence of common bile duct and tapering at the pancreatic head.  Extensive atherosclerosis is seen within the aorta and branching vessels.  The stomach is distended with gas, fluid, and particulate food matter.  The kidneys are relatively symmetric in size.  No hydronephrosis is seen.  There is mild bilateral renal lobulation.  A few ill-defined mildly prominent lymph nodes seen within the periaortic and pericaval region measuring up to 1 cm.  No dilated loops of bowel are identified.  Gas and feces are seen within the colon.  The appendix is within normal limits.  Scattered diverticula are noted to the descending and sigmoid colon.  The uterus is normal in size.  The bladder is partially distended with fluid.  No significant free fluid collection identified.  Impression: 1. Patchy infiltrate and atelectasis at the lung bases with 1.3 cm pleural base nodularity at left lung base.  Follow-up per Fleischner criteria  2. Ill-defined soft tissue/mucosal prominence at the distal esophagus/GE junction/hiatus hernia  3. Mild cardiomegaly  4. Extensive atherosclerosis  5. Diverticulosis coli  6. Findings and other details as above    Electronically signed by: Brody Dorsey  Date:    04/03/2025  Time:    08:46  X-Ray Abdomen Flat And Erect  Narrative: EXAMINATION:  XR ABDOMEN FLAT AND ERECT    CLINICAL HISTORY:  XR ABDOMEN FLAT AND ERECT, .    COMPARISON:  None available    FINDINGS:  Upright and supine views reveal a nonspecific bowel gas pattern without evidence of obstruction. No free air is seen outside of bowel lumen. Gas and feces are seen within the colon.  Degenerative changes and curvature noted to the thoracolumbar spine.  There is gaseous distension of the stomach.  Surgical clips noted to the right lower chest and  right upper abdomen.  Bony structures are osteopenic.  Impression: 1. Considerable gaseous distension of the stomach.  Clinical correlation is indicated  2. Thoracolumbar spondylosis, scoliosis, and osteopenia  3. Postsurgical changes at the right lower chest and right upper abdomen    Electronically signed by: Brody Dorsey  Date:    04/03/2025  Time:    08:38      ASSESSMENT & PLAN:     Acute blood loss anemia secondary to upper GI bleeding   Upper GI bleeding secondary to hemorrhagic gastritis   Hemorrhage gastritis   Alzheimer disease   Hypertension   Hypercholesterolemia    Continue NPO  Continue hydration with normal saline 100 cc/hour   Continue IV Protonix drip   Consult general surgeon   Check CBC BMP in a.m.      VTE Prophylaxis: will be placed on Heparin/Lovenox/ Xarelto/ SCD for DVT prophylaxis and will be advised to be as mobile as possible and sit in a chair as tolerated    Patient condition:  Stable/Fair/Guarded/ Serious/ Critical    __________________________________________________________________________  INPATIENT LIST OF MEDICATIONS     Scheduled Meds:  Continuous Infusions:   0.9% NaCl   Intravenous Continuous 100 mL/hr at 04/03/25 2204 New Bag at 04/03/25 2204    pantoprazole (PROTONIX) IV infusion  8 mg/hr Intravenous Continuous 20 mL/hr at 04/04/25 0939 8 mg/hr at 04/04/25 0939     PRN Meds:.  Current Facility-Administered Medications:     melatonin, 6 mg, Oral, Nightly PRN    sodium chloride 0.9%, 10 mL, Intravenous, PRN      I, _ NP/PA have reviewed and discussed the case with  _   Please see the following addendum for further assessment and plan from there attending MD.    04/04/2025    ________________________________________________________________________________    MD Addendum:  Dr. CITLALLI ---assumed care of this patient today at ---am/pm  For the patient encounter, I performed the substantive portion of the visit, I reviewed the NP/PA documentation, treatment plan, and medical decision  making.  I had face to face time with this patient     A. History:    B. Physical exam:    C. Medical decision making:    Discharge Planning and Disposition: No mobility needs. Ambulating well. Good social support system.   Anticipated discharge    If patient was admitted under observational status it is with my approval/permission.        All diagnosis and differential diagnosis have been reviewed; assessment and plan has been documented; I have personally reviewed the labs and test results that are presently available; I have reviewed the patients medication list; I have reviewed the consulting providers response and recommendations. I have reviewed or attempted to review medical records based upon their availability.    All of the patient and family questions have been addressed and answered. Patient's is agreeable to the above stated plan. I will continue to monitor closely and make adjustments to medical management as needed.    If patient was admitted under observational status it is with my approval/permission.      Chente Nathan MD   04/04/2025

## 2025-04-05 LAB
ANION GAP SERPL CALC-SCNC: 10 MEQ/L
BASOPHILS # BLD AUTO: 0.04 X10(3)/MCL
BASOPHILS NFR BLD AUTO: 0.3 %
BUN SERPL-MCNC: 12 MG/DL (ref 9.8–20.1)
CALCIUM SERPL-MCNC: 8 MG/DL (ref 8.4–10.2)
CHLORIDE SERPL-SCNC: 110 MMOL/L (ref 98–107)
CO2 SERPL-SCNC: 20 MMOL/L (ref 23–31)
CREAT SERPL-MCNC: 0.78 MG/DL (ref 0.55–1.02)
CREAT/UREA NIT SERPL: 15
EOSINOPHIL # BLD AUTO: 0.26 X10(3)/MCL (ref 0–0.9)
EOSINOPHIL NFR BLD AUTO: 1.9 %
ERYTHROCYTE [DISTWIDTH] IN BLOOD BY AUTOMATED COUNT: 14.2 % (ref 11.5–17)
GFR SERPLBLD CREATININE-BSD FMLA CKD-EPI: >60 ML/MIN/1.73/M2
GLUCOSE SERPL-MCNC: 93 MG/DL (ref 82–115)
HCT VFR BLD AUTO: 31.1 % (ref 37–47)
HGB BLD-MCNC: 9.7 G/DL (ref 12–16)
IMM GRANULOCYTES # BLD AUTO: 0.09 X10(3)/MCL (ref 0–0.04)
IMM GRANULOCYTES NFR BLD AUTO: 0.6 %
LYMPHOCYTES # BLD AUTO: 4.53 X10(3)/MCL (ref 0.6–4.6)
LYMPHOCYTES NFR BLD AUTO: 32.6 %
MCH RBC QN AUTO: 29.7 PG (ref 27–31)
MCHC RBC AUTO-ENTMCNC: 31.2 G/DL (ref 33–36)
MCV RBC AUTO: 95.1 FL (ref 80–94)
MONOCYTES # BLD AUTO: 0.96 X10(3)/MCL (ref 0.1–1.3)
MONOCYTES NFR BLD AUTO: 6.9 %
NEUTROPHILS # BLD AUTO: 8 X10(3)/MCL (ref 2.1–9.2)
NEUTROPHILS NFR BLD AUTO: 57.7 %
NRBC BLD AUTO-RTO: 0 %
PLATELET # BLD AUTO: 193 X10(3)/MCL (ref 130–400)
PMV BLD AUTO: 10.7 FL (ref 7.4–10.4)
POTASSIUM SERPL-SCNC: 3.5 MMOL/L (ref 3.5–5.1)
RBC # BLD AUTO: 3.27 X10(6)/MCL (ref 4.2–5.4)
SODIUM SERPL-SCNC: 140 MMOL/L (ref 136–145)
WBC # BLD AUTO: 13.88 X10(3)/MCL (ref 4.5–11.5)

## 2025-04-05 PROCEDURE — 63600175 PHARM REV CODE 636 W HCPCS: Performed by: INTERNAL MEDICINE

## 2025-04-05 PROCEDURE — 27000221 HC OXYGEN, UP TO 24 HOURS

## 2025-04-05 PROCEDURE — 11000001 HC ACUTE MED/SURG PRIVATE ROOM

## 2025-04-05 PROCEDURE — 94761 N-INVAS EAR/PLS OXIMETRY MLT: CPT

## 2025-04-05 PROCEDURE — 80048 BASIC METABOLIC PNL TOTAL CA: CPT | Performed by: INTERNAL MEDICINE

## 2025-04-05 PROCEDURE — 25000003 PHARM REV CODE 250: Performed by: INTERNAL MEDICINE

## 2025-04-05 PROCEDURE — 85025 COMPLETE CBC W/AUTO DIFF WBC: CPT | Performed by: INTERNAL MEDICINE

## 2025-04-05 PROCEDURE — 36415 COLL VENOUS BLD VENIPUNCTURE: CPT | Performed by: INTERNAL MEDICINE

## 2025-04-05 RX ORDER — ZIPRASIDONE MESYLATE 20 MG/ML
20 INJECTION, POWDER, LYOPHILIZED, FOR SOLUTION INTRAMUSCULAR EVERY 12 HOURS PRN
Status: DISCONTINUED | OUTPATIENT
Start: 2025-04-05 | End: 2025-04-06 | Stop reason: HOSPADM

## 2025-04-05 RX ORDER — LORAZEPAM 2 MG/ML
1 INJECTION INTRAMUSCULAR EVERY 4 HOURS PRN
Status: DISCONTINUED | OUTPATIENT
Start: 2025-04-05 | End: 2025-04-06 | Stop reason: HOSPADM

## 2025-04-05 RX ADMIN — PANTOPRAZOLE SODIUM 8 MG/HR: 40 INJECTION, POWDER, FOR SOLUTION INTRAVENOUS at 09:04

## 2025-04-05 RX ADMIN — PANTOPRAZOLE SODIUM 8 MG/HR: 40 INJECTION, POWDER, FOR SOLUTION INTRAVENOUS at 03:04

## 2025-04-05 RX ADMIN — SODIUM CHLORIDE: 9 INJECTION, SOLUTION INTRAVENOUS at 09:04

## 2025-04-05 RX ADMIN — MUPIROCIN 1 G: 20 OINTMENT TOPICAL at 09:04

## 2025-04-05 RX ADMIN — LORAZEPAM 1 MG: 2 INJECTION INTRAMUSCULAR; INTRAVENOUS at 05:04

## 2025-04-05 RX ADMIN — ZIPRASIDONE MESYLATE 10 MG: 20 INJECTION, POWDER, LYOPHILIZED, FOR SOLUTION INTRAMUSCULAR at 12:04

## 2025-04-05 NOTE — PROGRESS NOTES
[facility] Medicine progress note      Patient Name: Roxi Short  MRN: [id number]  Patient Class: IP- Inpatient   Admission Date: 4/3/2025   Admitting Physician: [facility] Service   Length of Stay: 1  Attending Physician: Chente Nathan MD  Primary Care Provider: Nathan Gomes MD  Face-to-Face encounter date: 04/05/2025  Code Status:  Chief Complaint: Emesis (Pt brought in by [facility] from [facility] with c/o vomiting coffee ground emesis once or twice. EMS states [facility] told them she also had 2 BM tonight.)      Screening for Social Drivers for health:  Patient screened for food insecurity, housing instability, transportation needs, utility difficulties, and interpersonal safety (select all that apply as identified as concern)  []Housing or Food  []Transportation Needs  []Utility Difficulties  []Interpersonal safety  [x]None      Patient information was obtained from patient, patient's family, past medical records and ER records.  ED records were reviewed in detail and documented below    HISTORY OF PRESENT ILLNESS:   Roxi Short is a 89 y.o. female who  has a past medical history of Allergic rhinitis, Alzheimer's disease, unspecified (CODE), Anxiety disorder, unspecified, Bilateral primary osteoarthritis of knee, Cognitive communication deficit, Constipation, Dementia, Essential (primary) hypertension, GERD (gastroesophageal reflux disease), herpesviral infection, unspecified, Hyperlipidemia, Insomnia, Major depressive disorder, single episode, unspecified, Malaise and fatigue, Muscle weakness (generalized), Pseudobulbar affect, Psychosis, unspecified psychosis type, Unspecified abnormalities of gait and mobility, Unspecified retinal disorder, Urinary tract infection, site not specified, and Vitamin deficiency..     89 years old female history of Alzheimer disease was transferred from nursing home to our emergency room secondary to coffee-ground emesis and black stool for 1 day     No fever, no shortness for  breath, no chest pain     The patient denies any Advil or Motrin use     At emergency room, WBC 10, hemoglobin 9.2, BUN 31 creatinine 0.7     Abdominal x-ray shows gastric distention     The patient was admitted to the hospital for upper GI bleeding    General surgeon performed the emergency EGD and EGD shows hemorrhagic gastritis    April 4, 2025-no black or bloody stool overnight, hemoglobin stable 9.9    April 5, 2025-no complaints, no fever, no shortness for breath, no nausea, hemoglobin stable 9.7    PAST MEDICAL HISTORY:     Past Medical History:   Diagnosis Date    Allergic rhinitis     Alzheimer's disease, unspecified (CODE)     Anxiety disorder, unspecified     Bilateral primary osteoarthritis of knee     Cognitive communication deficit     Constipation     Dementia     Essential (primary) hypertension     GERD (gastroesophageal reflux disease)     herpesviral infection, unspecified     Hyperlipidemia     Insomnia     Major depressive disorder, single episode, unspecified     Malaise and fatigue     Muscle weakness (generalized)     Pseudobulbar affect     Psychosis, unspecified psychosis type     Unspecified abnormalities of gait and mobility     Unspecified retinal disorder     Urinary tract infection, site not specified     Vitamin deficiency        PAST SURGICAL HISTORY:     Past Surgical History:   Procedure Laterality Date    ESOPHAGOGASTRODUODENOSCOPY N/A 4/3/2025    Procedure: EGD (ESOPHAGOGASTRODUODENOSCOPY);  Surgeon: Mae Alcazar MD;  Location: [facility];  Service: General;  Laterality: N/A;  POST OP:HEMORRHAGIC GASTRIS, UPPER GI BLEED, HEMORRHAGIC GASTRITIS, LARGE I HIATAL HERNIA       ALLERGIES:   Levofloxacin, Codeine, and Sulfa (sulfonamide antibiotics)    FAMILY HISTORY:   Reviewed and negative    SOCIAL HISTORY:     Social History     Tobacco Use    Smoking status: Unknown    Smokeless tobacco: Not on file   Substance Use Topics    Alcohol use: Not on file        HOME MEDICATIONS:      Prior to Admission medications    Medication Sig Start Date End Date Taking? Authorizing Provider   acetaminophen (TYLENOL) 325 MG tablet Take 325 mg by mouth 4 (four) times daily.   Yes Provider, Historical   acyclovir (ZOVIRAX) 400 MG tablet Take 400 mg by mouth 2 (two) times daily. 3/27/25  Yes Provider, Historical   aspirin 81 MG Chew Take 81 mg by mouth once daily.   Yes Provider, Historical   atorvastatin (LIPITOR) 20 MG tablet Take 20 mg by mouth every evening. 3/31/25  Yes Provider, Historical   brexpiprazole (REXULTI) 1 mg Tab Take 1 tablet by mouth Daily.   Yes Provider, Historical   carvediloL (COREG) 12.5 MG tablet Take 12.5 mg by mouth 2 (two) times daily. Hold if SBP <120 AND/OR Pulse <60 3/25/25  Yes Provider, Historical   cetirizine (ZYRTEC) 10 MG tablet Take 10 mg by mouth every evening.   Yes Provider, Historical   cloNIDine (CATAPRES) 0.1 MG tablet Take 0.1 mg by mouth every 8 (eight) hours as needed (SBP >160). 11/20/24  Yes Provider, Historical   cranberry fruit (CRANBERRY) 450 mg Tab Take 1 tablet by mouth nightly.   Yes Provider, Historical   dextromethorphan-quinidine 20-10 mg (NUEDEXTA) 20-10 mg per capsule Take 1 capsule by mouth 2 (two) times a day.   Yes Provider, Historical   famotidine (PEPCID) 20 MG tablet Take 20 mg by mouth Daily. 3/31/25  Yes Provider, Historical   memantine (NAMENDA) 10 MG Tab Take 10 mg by mouth 2 (two) times daily. 3/25/25  Yes Provider, Historical   mirtazapine (REMERON) 15 MG tablet Take 15 mg by mouth every evening. 3/28/25  Yes Provider, Historical   MULTIVITAMIN ORAL Take 1 tablet by mouth once daily.   Yes Provider, Historical   mv-mn/om3/dha/epa/fish/lut/lima (OCUVITE ADULT 50 PLUS ORAL) Take 1 capsule by mouth Daily.   Yes Provider, Historical   peg 400-propylene glycol, PF, 0.4-0.3 % Drop Apply 1 drop to eye 3 (three) times daily. Instill 1 drop in both eyes 3 times a day   Yes Provider, Historical   polyethylene glycol (GLYCOLAX) 17 gram PwPk Take 17  g by mouth once daily.   Yes Provider, Historical   protein hydrolysate,milk (LIQUID PROTEIN FORTIFIER ORAL) Take 30 mLs by mouth Daily.   Yes Provider, Historical   risperiDONE (RISPERDAL) 0.25 MG Tab Take 0.25 mg by mouth nightly. 4/1/25  Yes Provider, Historical   rivastigmine 13.3 mg/24 hour PT24 Apply 1 patch topically Daily. 3/24/25  Yes Provider, Historical   valsartan (DIOVAN) 40 MG tablet Take 40 mg by mouth once daily. 3/21/25  Yes Provider, Historical   venlafaxine (EFFEXOR-XR) 150 MG Cp24 Take 150 mg by mouth once daily.   Yes Provider, Historical   vitamin D (VITAMIN D3) 1000 units Tab Take 1 tablet by mouth once daily.   Yes Provider, Historical       REVIEW OF SYSTEMS:   Except as documented, all other systems reviewed and negative     PHYSICAL EXAM:     VITAL SIGNS: 24 HRS MIN & MAX LAST   Temp  Min: 97.9 °F (36.6 °C)  Max: 99 °F (37.2 °C) 97.9 °F (36.6 °C)   BP  Min: 123/56  Max: 155/71 (!) 139/56   Pulse  Min: 90  Max: 105  90   Resp  Min: 20  Max: 20 20   SpO2  Min: 93 %  Max: 97 % 97 %     General appearance: Well-developed, well-nourished female in no apparent distress.  HENT: Atraumatic head. Moist mucous membranes of oral cavity.  Eyes: Normal extraocular movements.   Neck: Supple.   Lungs: Clear to auscultation bilaterally. No wheezing present.   Heart: Regular rate and rhythm. S1 and S2 present with no murmurs/gallop/rub. No pedal edema. No JVD present.   Abdomen: Soft, non-distended, non-tender. No rebound tenderness/guarding. Bowel sounds are normal.   Extremities: No cyanosis, clubbing, or edema.  Skin: No Rash.   Neuro: Motor and sensory exams grossly intact. Good tone. Muscle strength 5/5 in all 4 extremities  Psych/mental status: Appropriate mood and affect.  Demented    LABS AND IMAGING:     Recent Labs   Lab 04/03/25  0404 04/04/25  0927 04/05/25  0347   WBC 10.51 15.29* 13.88*   RBC 3.00* 3.24* 3.27*   HGB 9.2* 9.9* 9.7*   HCT 29.9* 31.4* 31.1*   MCV 99.7* 96.9* 95.1*   MCH 30.7  30.6 29.7   MCHC 30.8* 31.5* 31.2*   RDW 14.1 14.3 14.2    177 193   MPV 10.4 10.5* 10.7*       Recent Labs   Lab 04/03/25  0404 04/04/25  0927 04/05/25  0347   * 141 140   K 3.8 3.7 3.5   * 111* 110*   CO2 23 24 20*   BUN 31.0* 23.0* 12.0   CREATININE 0.77 0.97 0.78   CALCIUM 7.9* 7.8* 8.0*   ALBUMIN 2.8*  --   --    ALKPHOS 73  --   --    ALT 10  --   --    AST 13  --   --    BILITOT 0.4  --   --        Microbiology Results (last 7 days)       Procedure Component Value Units Date/Time    Blood culture #1 **CANNOT BE ORDERED STAT** [[id number]]  (Normal) Collected: 04/03/25 0523    Order Status: Completed Specimen: Blood from Antecubital, Left Updated: 04/05/25 0800     Blood Culture No Growth At 48 Hours    Blood culture #2 **CANNOT BE ORDERED STAT** [[id number]]  (Normal) Collected: 04/03/25 0536    Order Status: Completed Specimen: Blood from Forearm, Left Updated: 04/05/25 0800     Blood Culture No Growth At 48 Hours    Clostridium Diff Toxin, A & B, EIA [[id number]]     Order Status: Sent Specimen: Stool              CT Abdomen Pelvis With IV Contrast NO Oral Contrast  Narrative: EXAMINATION:  CT ABDOMEN PELVIS WITH IV CONTRAST    CLINICAL HISTORY:  GI bleed;, .    TECHNIQUE:  PATIENT RADIATION DOSE: DLP(mGycm) : 869    As per PQRS measures, all CT scans at this facility used dose modulation, iterative reconstruction, and/or weight based dose adjustment when appropriate to reduce radiation dose to as low as reasonably achievable.    COMPARISON:  None available    FINDINGS:  Serial axial images were obtained through the abdomen and pelvis with the administration of  IV contrast. Coronal and sagittal reconstructions where also obtained. Degenerative changes and curvature are noted to the thoracolumbar spine.  Bony structures are osteopenic.  There is grade 1 anterolisthesis of L4 on L5.  There is compression deformity evident at T12 and L1.  There is slight retrolisthesis of L1 on L2.  Patchy  hazy linear opacities evident at the lung bases.  There is nodule at the posterior left lung base measuring 1.3 cm.  The heart is mildly enlarged.  There is soft tissue/mucosal prominence at the distal esophagus/hiatus hernia.  There is beam hardening artifact at the upper abdomen due to positioning.  The liver, spleen, adrenal glands, and pancreas are grossly within normal limits.  The gallbladder is surgically absent.  There is mild prominence of common bile duct and tapering at the pancreatic head.  Extensive atherosclerosis is seen within the aorta and branching vessels.  The stomach is distended with gas, fluid, and particulate food matter.  The kidneys are relatively symmetric in size.  No hydronephrosis is seen.  There is mild bilateral renal lobulation.  A few ill-defined mildly prominent lymph nodes seen within the periaortic and pericaval region measuring up to 1 cm.  No dilated loops of bowel are identified.  Gas and feces are seen within the colon.  The appendix is within normal limits.  Scattered diverticula are noted to the descending and sigmoid colon.  The uterus is normal in size.  The bladder is partially distended with fluid.  No significant free fluid collection identified.  Impression: 1. Patchy infiltrate and atelectasis at the lung bases with 1.3 cm pleural base nodularity at left lung base.  Follow-up per Fleischner criteria  2. Ill-defined soft tissue/mucosal prominence at the distal esophagus/GE junction/hiatus hernia  3. Mild cardiomegaly  4. Extensive atherosclerosis  5. Diverticulosis coli  6. Findings and other details as above    Electronically signed by: Brody Dorsey  Date:    04/03/2025  Time:    08:46  X-Ray Abdomen Flat And Erect  Narrative: EXAMINATION:  XR ABDOMEN FLAT AND ERECT    CLINICAL HISTORY:  XR ABDOMEN FLAT AND ERECT, .    COMPARISON:  None available    FINDINGS:  Upright and supine views reveal a nonspecific bowel gas pattern without evidence of obstruction. No free  air is seen outside of bowel lumen. Gas and feces are seen within the colon.  Degenerative changes and curvature noted to the thoracolumbar spine.  There is gaseous distension of the stomach.  Surgical clips noted to the right lower chest and right upper abdomen.  Bony structures are osteopenic.  Impression: 1. Considerable gaseous distension of the stomach.  Clinical correlation is indicated  2. Thoracolumbar spondylosis, scoliosis, and osteopenia  3. Postsurgical changes at the right lower chest and right upper abdomen    Electronically signed by: Brody Dorsey  Date:    04/03/2025  Time:    08:38      ASSESSMENT & PLAN:     Acute blood loss anemia secondary to upper GI bleeding   Upper GI bleeding secondary to hemorrhagic gastritis   Hemorrhage gastritis   Alzheimer disease   Hypertension   Hypercholesterolemia      Continue hydration with normal saline 100 cc/hour   Continue IV Protonix drip   Consult general surgeon   Check CBC BMP in a.m.      VTE Prophylaxis: will be placed on Heparin/Lovenox/ Xarelto/ SCD for DVT prophylaxis and will be advised to be as mobile as possible and sit in a chair as tolerated    Patient condition:  Stable/Fair/Guarded/ Serious/ Critical    __________________________________________________________________________  INPATIENT LIST OF MEDICATIONS     Scheduled Meds:   mupirocin   Nasal BID     Continuous Infusions:   0.9% NaCl   Intravenous Continuous 100 mL/hr at 04/04/25 2203 New Bag at 04/04/25 2203    pantoprazole (PROTONIX) IV infusion  8 mg/hr Intravenous Continuous 20 mL/hr at 04/05/25 0933 8 mg/hr at 04/05/25 0933     PRN Meds:.  Current Facility-Administered Medications:     melatonin, 6 mg, Oral, Nightly PRN    sodium chloride 0.9%, 10 mL, Intravenous, PRN    ziprasidone, 10 mg, Intramuscular, Q6H PRN      I, _ NP/PA have reviewed and discussed the case with DrReal _   Please see the following addendum for further assessment and plan from there attending  MD.    04/05/2025    ________________________________________________________________________________    MD Addendum:  Dr. CITLALLI ---assumed care of this patient today at ---am/pm  For the patient encounter, I performed the substantive portion of the visit, I reviewed the NP/PA documentation, treatment plan, and medical decision making.  I had face to face time with this patient     A. History:    B. Physical exam:    C. Medical decision making:    Discharge Planning and Disposition: No mobility needs. Ambulating well. Good social support system.   Anticipated discharge    If patient was admitted under observational status it is with my approval/permission.        All diagnosis and differential diagnosis have been reviewed; assessment and plan has been documented; I have personally reviewed the labs and test results that are presently available; I have reviewed the patients medication list; I have reviewed the consulting providers response and recommendations. I have reviewed or attempted to review medical records based upon their availability.    All of the patient and family questions have been addressed and answered. Patient's is agreeable to the above stated plan. I will continue to monitor closely and make adjustments to medical management as needed.    If patient was admitted under observational status it is with my approval/permission.      Chente Nathan MD   04/05/2025       I have personally evaluated and examined the patient. The Attending was available to me as a supervising provider if needed.

## 2025-04-06 VITALS
OXYGEN SATURATION: 91 % | BODY MASS INDEX: 29.02 KG/M2 | WEIGHT: 170 LBS | TEMPERATURE: 98 F | DIASTOLIC BLOOD PRESSURE: 65 MMHG | SYSTOLIC BLOOD PRESSURE: 143 MMHG | RESPIRATION RATE: 20 BRPM | HEART RATE: 95 BPM | HEIGHT: 64 IN

## 2025-04-06 LAB
BASOPHILS # BLD AUTO: 0.03 X10(3)/MCL
BASOPHILS NFR BLD AUTO: 0.3 %
EOSINOPHIL # BLD AUTO: 0.23 X10(3)/MCL (ref 0–0.9)
EOSINOPHIL NFR BLD AUTO: 2.1 %
ERYTHROCYTE [DISTWIDTH] IN BLOOD BY AUTOMATED COUNT: 13.9 % (ref 11.5–17)
HCT VFR BLD AUTO: 31.8 % (ref 37–47)
HGB BLD-MCNC: 10.1 G/DL (ref 12–16)
IMM GRANULOCYTES # BLD AUTO: 0.06 X10(3)/MCL (ref 0–0.04)
IMM GRANULOCYTES NFR BLD AUTO: 0.5 %
LYMPHOCYTES # BLD AUTO: 3.27 X10(3)/MCL (ref 0.6–4.6)
LYMPHOCYTES NFR BLD AUTO: 29.8 %
MCH RBC QN AUTO: 29.9 PG (ref 27–31)
MCHC RBC AUTO-ENTMCNC: 31.8 G/DL (ref 33–36)
MCV RBC AUTO: 94.1 FL (ref 80–94)
MONOCYTES # BLD AUTO: 0.65 X10(3)/MCL (ref 0.1–1.3)
MONOCYTES NFR BLD AUTO: 5.9 %
NEUTROPHILS # BLD AUTO: 6.75 X10(3)/MCL (ref 2.1–9.2)
NEUTROPHILS NFR BLD AUTO: 61.4 %
NRBC BLD AUTO-RTO: 0 %
PLATELET # BLD AUTO: 200 X10(3)/MCL (ref 130–400)
PMV BLD AUTO: 10.1 FL (ref 7.4–10.4)
RBC # BLD AUTO: 3.38 X10(6)/MCL (ref 4.2–5.4)
WBC # BLD AUTO: 10.99 X10(3)/MCL (ref 4.5–11.5)

## 2025-04-06 PROCEDURE — 94761 N-INVAS EAR/PLS OXIMETRY MLT: CPT

## 2025-04-06 PROCEDURE — 63600175 PHARM REV CODE 636 W HCPCS: Performed by: INTERNAL MEDICINE

## 2025-04-06 PROCEDURE — 25000003 PHARM REV CODE 250: Performed by: INTERNAL MEDICINE

## 2025-04-06 PROCEDURE — 85025 COMPLETE CBC W/AUTO DIFF WBC: CPT | Performed by: INTERNAL MEDICINE

## 2025-04-06 PROCEDURE — 36415 COLL VENOUS BLD VENIPUNCTURE: CPT | Performed by: INTERNAL MEDICINE

## 2025-04-06 RX ORDER — PANTOPRAZOLE SODIUM 40 MG/1
40 TABLET, DELAYED RELEASE ORAL DAILY
Qty: 90 TABLET | Refills: 3 | Status: SHIPPED | OUTPATIENT
Start: 2025-04-06 | End: 2026-04-06

## 2025-04-06 RX ADMIN — PANTOPRAZOLE SODIUM 8 MG/HR: 40 INJECTION, POWDER, FOR SOLUTION INTRAVENOUS at 09:04

## 2025-04-06 RX ADMIN — MUPIROCIN 1 G: 20 OINTMENT TOPICAL at 09:04

## 2025-04-06 RX ADMIN — PANTOPRAZOLE SODIUM 8 MG/HR: 40 INJECTION, POWDER, FOR SOLUTION INTRAVENOUS at 03:04

## 2025-04-06 NOTE — DISCHARGE SUMMARY
Hospital Medicine discharge summary      Patient Name: Roxi Short  MRN: [id number]  Patient Class: IP- Inpatient   Admission Date: 4/3/2025   Admitting Physician: [facility]   Length of Stay: 2  Attending Physician: Chente Nathan MD  Primary Care Provider: Nathan Gomes MD  Face-to-Face encounter date: 04/06/2025  Code Status:  Chief Complaint: Emesis (Pt brought in by [facility] from [facility] with c/o vomiting coffee ground emesis once or twice. EMS states [facility] told them she also had 2 BM tonight.)      Screening for Social Drivers for health:  Patient screened for food insecurity, housing instability, transportation needs, utility difficulties, and interpersonal safety (select all that apply as identified as concern)  []Housing or Food  []Transportation Needs  []Utility Difficulties  []Interpersonal safety  [x]None      Patient information was obtained from patient, patient's family, past medical records and ER records.  ED records were reviewed in detail and documented below    HISTORY OF PRESENT ILLNESS:   Roxi Short is a 89 y.o. female who  has a past medical history of Allergic rhinitis, Alzheimer's disease, unspecified (CODE), Anxiety disorder, unspecified, Bilateral primary osteoarthritis of knee, Cognitive communication deficit, Constipation, Dementia, Essential (primary) hypertension, GERD (gastroesophageal reflux disease), herpesviral infection, unspecified, Hyperlipidemia, Insomnia, Major depressive disorder, single episode, unspecified, Malaise and fatigue, Muscle weakness (generalized), Pseudobulbar affect, Psychosis, unspecified psychosis type, Unspecified abnormalities of gait and mobility, Unspecified retinal disorder, Urinary tract infection, site not specified, and Vitamin deficiency..     89 years old female history of Alzheimer disease was transferred from nursing home to our emergency room secondary to coffee-ground emesis and black stool for 1 day     No fever, no shortness for  breath, no chest pain     The patient denies any Advil or Motrin use     At emergency room, WBC 10, hemoglobin 9.2, BUN 31 creatinine 0.7     Abdominal x-ray shows gastric distention     The patient was admitted to the hospital for upper GI bleeding    General surgeon performed the emergency EGD and EGD shows hemorrhagic gastritis    April 4, 2025-no black or bloody stool overnight, hemoglobin stable 9.9    April 5, 2025-no complaints, no fever, no shortness for breath, no nausea, hemoglobin stable 9.7    April 6, 2025-hemoglobin stable 10.1, no complaints, no fever, no shortness for breath, no nausea     Patient can go back to nursing home today     I gave the patient Protonix prescription    PAST MEDICAL HISTORY:     Past Medical History:   Diagnosis Date    Allergic rhinitis     Alzheimer's disease, unspecified (CODE)     Anxiety disorder, unspecified     Bilateral primary osteoarthritis of knee     Cognitive communication deficit     Constipation     Dementia     Essential (primary) hypertension     GERD (gastroesophageal reflux disease)     herpesviral infection, unspecified     Hyperlipidemia     Insomnia     Major depressive disorder, single episode, unspecified     Malaise and fatigue     Muscle weakness (generalized)     Pseudobulbar affect     Psychosis, unspecified psychosis type     Unspecified abnormalities of gait and mobility     Unspecified retinal disorder     Urinary tract infection, site not specified     Vitamin deficiency        PAST SURGICAL HISTORY:     Past Surgical History:   Procedure Laterality Date    ESOPHAGOGASTRODUODENOSCOPY N/A 4/3/2025    Procedure: EGD (ESOPHAGOGASTRODUODENOSCOPY);  Surgeon: Mae Alcazar MD;  Location: [facility];  Service: General;  Laterality: N/A;  POST OP:HEMORRHAGIC GASTRIS, UPPER GI BLEED, HEMORRHAGIC GASTRITIS, LARGE I HIATAL HERNIA       ALLERGIES:   Levofloxacin, Codeine, and Sulfa (sulfonamide antibiotics)    FAMILY HISTORY:   Reviewed and  negative    SOCIAL HISTORY:     Social History     Tobacco Use    Smoking status: Unknown    Smokeless tobacco: Not on file   Substance Use Topics    Alcohol use: Not on file        HOME MEDICATIONS:     Prior to Admission medications    Medication Sig Start Date End Date Taking? Authorizing Provider   acetaminophen (TYLENOL) 325 MG tablet Take 325 mg by mouth 4 (four) times daily.   Yes Provider, Historical   acyclovir (ZOVIRAX) 400 MG tablet Take 400 mg by mouth 2 (two) times daily. 3/27/25  Yes Provider, Historical   aspirin 81 MG Chew Take 81 mg by mouth once daily.   Yes Provider, Historical   atorvastatin (LIPITOR) 20 MG tablet Take 20 mg by mouth every evening. 3/31/25  Yes Provider, Historical   brexpiprazole (REXULTI) 1 mg Tab Take 1 tablet by mouth Daily.   Yes Provider, Historical   carvediloL (COREG) 12.5 MG tablet Take 12.5 mg by mouth 2 (two) times daily. Hold if SBP <120 AND/OR Pulse <60 3/25/25  Yes Provider, Historical   cetirizine (ZYRTEC) 10 MG tablet Take 10 mg by mouth every evening.   Yes Provider, Historical   cloNIDine (CATAPRES) 0.1 MG tablet Take 0.1 mg by mouth every 8 (eight) hours as needed (SBP >160). 11/20/24  Yes Provider, Historical   cranberry fruit (CRANBERRY) 450 mg Tab Take 1 tablet by mouth nightly.   Yes Provider, Historical   dextromethorphan-quinidine 20-10 mg (NUEDEXTA) 20-10 mg per capsule Take 1 capsule by mouth 2 (two) times a day.   Yes Provider, Historical   famotidine (PEPCID) 20 MG tablet Take 20 mg by mouth Daily. 3/31/25  Yes Provider, Historical   memantine (NAMENDA) 10 MG Tab Take 10 mg by mouth 2 (two) times daily. 3/25/25  Yes Provider, Historical   mirtazapine (REMERON) 15 MG tablet Take 15 mg by mouth every evening. 3/28/25  Yes Provider, Historical   MULTIVITAMIN ORAL Take 1 tablet by mouth once daily.   Yes Provider, Historical   mv-mn/om3/dha/epa/fish/lut/lima (OCUVITE ADULT 50 PLUS ORAL) Take 1 capsule by mouth Daily.   Yes Provider, Historical   peg  400-propylene glycol, PF, 0.4-0.3 % Drop Apply 1 drop to eye 3 (three) times daily. Instill 1 drop in both eyes 3 times a day   Yes Provider, Historical   polyethylene glycol (GLYCOLAX) 17 gram PwPk Take 17 g by mouth once daily.   Yes Provider, Historical   protein hydrolysate,milk (LIQUID PROTEIN FORTIFIER ORAL) Take 30 mLs by mouth Daily.   Yes Provider, Historical   risperiDONE (RISPERDAL) 0.25 MG Tab Take 0.25 mg by mouth nightly. 4/1/25  Yes Provider, Historical   rivastigmine 13.3 mg/24 hour PT24 Apply 1 patch topically Daily. 3/24/25  Yes Provider, Historical   valsartan (DIOVAN) 40 MG tablet Take 40 mg by mouth once daily. 3/21/25  Yes Provider, Historical   venlafaxine (EFFEXOR-XR) 150 MG Cp24 Take 150 mg by mouth once daily.   Yes Provider, Historical   vitamin D (VITAMIN D3) 1000 units Tab Take 1 tablet by mouth once daily.   Yes Provider, Historical       REVIEW OF SYSTEMS:   Except as documented, all other systems reviewed and negative     PHYSICAL EXAM:     VITAL SIGNS: 24 HRS MIN & MAX LAST   Temp  Min: 98.8 °F (37.1 °C)  Max: 99.5 °F (37.5 °C) 99 °F (37.2 °C)   BP  Min: 143/75  Max: 175/74 (!) 150/70   Pulse  Min: 85  Max: 100  85   Resp  Min: 20  Max: 20 20   SpO2  Min: 92 %  Max: 99 % (!) 92 %     General appearance: Well-developed, well-nourished female in no apparent distress.  HENT: Atraumatic head. Moist mucous membranes of oral cavity.  Eyes: Normal extraocular movements.   Neck: Supple.   Lungs: Clear to auscultation bilaterally. No wheezing present.   Heart: Regular rate and rhythm. S1 and S2 present with no murmurs/gallop/rub. No pedal edema. No JVD present.   Abdomen: Soft, non-distended, non-tender. No rebound tenderness/guarding. Bowel sounds are normal.   Extremities: No cyanosis, clubbing, or edema.  Skin: No Rash.   Neuro: Motor and sensory exams grossly intact. Good tone. Muscle strength 5/5 in all 4 extremities  Psych/mental status: Appropriate mood and affect.  Demented    LABS  AND IMAGING:     Recent Labs   Lab 04/04/25  0927 04/05/25 0347 04/06/25  0911   WBC 15.29* 13.88* 10.99   RBC 3.24* 3.27* 3.38*   HGB 9.9* 9.7* 10.1*   HCT 31.4* 31.1* 31.8*   MCV 96.9* 95.1* 94.1*   MCH 30.6 29.7 29.9   MCHC 31.5* 31.2* 31.8*   RDW 14.3 14.2 13.9    193 200   MPV 10.5* 10.7* 10.1       Recent Labs   Lab 04/03/25  0404 04/04/25 0927 04/05/25 0347   * 141 140   K 3.8 3.7 3.5   * 111* 110*   CO2 23 24 20*   BUN 31.0* 23.0* 12.0   CREATININE 0.77 0.97 0.78   CALCIUM 7.9* 7.8* 8.0*   ALBUMIN 2.8*  --   --    ALKPHOS 73  --   --    ALT 10  --   --    AST 13  --   --    BILITOT 0.4  --   --        Microbiology Results (last 7 days)       Procedure Component Value Units Date/Time    Blood culture #1 **CANNOT BE ORDERED STAT** [[id number]]  (Normal) Collected: 04/03/25 0523    Order Status: Completed Specimen: Blood from Antecubital, Left Updated: 04/06/25 0800     Blood Culture No Growth At 72 Hours    Blood culture #2 **CANNOT BE ORDERED STAT** [[id number]]  (Normal) Collected: 04/03/25 0536    Order Status: Completed Specimen: Blood from Forearm, Left Updated: 04/06/25 0800     Blood Culture No Growth At 72 Hours    Clostridium Diff Toxin, A & B, EIA [[id number]]     Order Status: Canceled Specimen: Stool              CT Abdomen Pelvis With IV Contrast NO Oral Contrast  Narrative: EXAMINATION:  CT ABDOMEN PELVIS WITH IV CONTRAST    CLINICAL HISTORY:  GI bleed;, .    TECHNIQUE:  PATIENT RADIATION DOSE: DLP(mGycm) : 869    As per PQRS measures, all CT scans at this facility used dose modulation, iterative reconstruction, and/or weight based dose adjustment when appropriate to reduce radiation dose to as low as reasonably achievable.    COMPARISON:  None available    FINDINGS:  Serial axial images were obtained through the abdomen and pelvis with the administration of  IV contrast. Coronal and sagittal reconstructions where also obtained. Degenerative changes and curvature are noted  to the thoracolumbar spine.  Bony structures are osteopenic.  There is grade 1 anterolisthesis of L4 on L5.  There is compression deformity evident at T12 and L1.  There is slight retrolisthesis of L1 on L2.  Patchy hazy linear opacities evident at the lung bases.  There is nodule at the posterior left lung base measuring 1.3 cm.  The heart is mildly enlarged.  There is soft tissue/mucosal prominence at the distal esophagus/hiatus hernia.  There is beam hardening artifact at the upper abdomen due to positioning.  The liver, spleen, adrenal glands, and pancreas are grossly within normal limits.  The gallbladder is surgically absent.  There is mild prominence of common bile duct and tapering at the pancreatic head.  Extensive atherosclerosis is seen within the aorta and branching vessels.  The stomach is distended with gas, fluid, and particulate food matter.  The kidneys are relatively symmetric in size.  No hydronephrosis is seen.  There is mild bilateral renal lobulation.  A few ill-defined mildly prominent lymph nodes seen within the periaortic and pericaval region measuring up to 1 cm.  No dilated loops of bowel are identified.  Gas and feces are seen within the colon.  The appendix is within normal limits.  Scattered diverticula are noted to the descending and sigmoid colon.  The uterus is normal in size.  The bladder is partially distended with fluid.  No significant free fluid collection identified.  Impression: 1. Patchy infiltrate and atelectasis at the lung bases with 1.3 cm pleural base nodularity at left lung base.  Follow-up per Fleischner criteria  2. Ill-defined soft tissue/mucosal prominence at the distal esophagus/GE junction/hiatus hernia  3. Mild cardiomegaly  4. Extensive atherosclerosis  5. Diverticulosis coli  6. Findings and other details as above    Electronically signed by: Brody Dorsey  Date:    04/03/2025  Time:    08:46  X-Ray Abdomen Flat And Erect  Narrative: EXAMINATION:  XR ABDOMEN  FLAT AND ERECT    CLINICAL HISTORY:  XR ABDOMEN FLAT AND ERECT, .    COMPARISON:  None available    FINDINGS:  Upright and supine views reveal a nonspecific bowel gas pattern without evidence of obstruction. No free air is seen outside of bowel lumen. Gas and feces are seen within the colon.  Degenerative changes and curvature noted to the thoracolumbar spine.  There is gaseous distension of the stomach.  Surgical clips noted to the right lower chest and right upper abdomen.  Bony structures are osteopenic.  Impression: 1. Considerable gaseous distension of the stomach.  Clinical correlation is indicated  2. Thoracolumbar spondylosis, scoliosis, and osteopenia  3. Postsurgical changes at the right lower chest and right upper abdomen    Electronically signed by: Brody Dorsey  Date:    04/03/2025  Time:    08:38      ASSESSMENT & PLAN:   Discharge diagnosis  Acute blood loss anemia secondary to upper GI bleeding   Upper GI bleeding secondary to hemorrhagic gastritis   Hemorrhage gastritis   Alzheimer disease   Hypertension   Hypercholesterolemia    Discharge condition-stable     Discharge destination-nursing home-discharge time 30 minutes            VTE Prophylaxis: will be placed on Heparin/Lovenox/ Xarelto/ SCD for DVT prophylaxis and will be advised to be as mobile as possible and sit in a chair as tolerated    Patient condition:  Stable/Fair/Guarded/ Serious/ Critical    __________________________________________________________________________  INPATIENT LIST OF MEDICATIONS     Scheduled Meds:   mupirocin   Nasal BID     Continuous Infusions:   0.9% NaCl   Intravenous Continuous 100 mL/hr at 04/05/25 2120 New Bag at 04/05/25 2120    pantoprazole (PROTONIX) IV infusion  8 mg/hr Intravenous Continuous 20 mL/hr at 04/06/25 0907 8 mg/hr at 04/06/25 0907     PRN Meds:.  Current Facility-Administered Medications:     lorazepam, 1 mg, Intravenous, Q4H PRN    melatonin, 6 mg, Oral, Nightly PRN    sodium chloride 0.9%,  10 mL, Intravenous, PRN    ziprasidone, 20 mg, Intramuscular, Q12H PRN      I, _ NP/PA have reviewed and discussed the case with  _   Please see the following addendum for further assessment and plan from there attending MD.    04/06/2025    ________________________________________________________________________________    MD Addendum:  Dr. CITLALLI ---assumed care of this patient today at ---am/pm  For the patient encounter, I performed the substantive portion of the visit, I reviewed the NP/PA documentation, treatment plan, and medical decision making.  I had face to face time with this patient     A. History:    B. Physical exam:    C. Medical decision making:    Discharge Planning and Disposition: No mobility needs. Ambulating well. Good social support system.   Anticipated discharge    If patient was admitted under observational status it is with my approval/permission.        All diagnosis and differential diagnosis have been reviewed; assessment and plan has been documented; I have personally reviewed the labs and test results that are presently available; I have reviewed the patients medication list; I have reviewed the consulting providers response and recommendations. I have reviewed or attempted to review medical records based upon their availability.    All of the patient and family questions have been addressed and answered. Patient's is agreeable to the above stated plan. I will continue to monitor closely and make adjustments to medical management as needed.    If patient was admitted under observational status it is with my approval/permission.      Chente Nathan MD   04/06/2025

## 2025-04-06 NOTE — PLAN OF CARE
Problem: Adult Inpatient Plan of Care  Goal: Plan of Care Review  Outcome: Progressing  Goal: Patient-Specific Goal (Individualized)  Outcome: Progressing  Goal: Absence of Hospital-Acquired Illness or Injury  Outcome: Progressing  Goal: Optimal Comfort and Wellbeing  Outcome: Progressing  Goal: Readiness for Transition of Care  Outcome: Progressing     Problem: Fall Injury Risk  Goal: Absence of Fall and Fall-Related Injury  Outcome: Progressing     Problem: Gastrointestinal Bleeding  Goal: Optimal Coping with Acute Illness  Outcome: Progressing  Goal: Hemostasis  Outcome: Progressing     Problem: Anemia  Goal: Anemia Symptom Improvement  Outcome: Progressing     Problem: UTI (Urinary Tract Infection)  Goal: Improved Infection Symptoms  Outcome: Progressing     Problem: Comorbidity Management  Goal: Maintenance of Behavioral Health Symptom Control  Outcome: Progressing  Goal: Blood Pressure in Desired Range  Outcome: Progressing     Problem: Skin Injury Risk Increased  Goal: Skin Health and Integrity  Outcome: Progressing

## 2025-04-06 NOTE — PLAN OF CARE
[facility] faxed discharge information to [facility].    Nurse for report is Rebeca [phone]

## 2025-04-08 LAB
BACTERIA BLD CULT: NORMAL
BACTERIA BLD CULT: NORMAL

## (undated) DEVICE — KIT SURGICAL COLON .25 1.1OZ